# Patient Record
Sex: FEMALE | Race: WHITE | NOT HISPANIC OR LATINO | Employment: FULL TIME | URBAN - METROPOLITAN AREA
[De-identification: names, ages, dates, MRNs, and addresses within clinical notes are randomized per-mention and may not be internally consistent; named-entity substitution may affect disease eponyms.]

---

## 2019-05-06 ENCOUNTER — CLINICAL SUPPORT (OUTPATIENT)
Dept: OBGYN CLINIC | Facility: CLINIC | Age: 24
End: 2019-05-06
Payer: COMMERCIAL

## 2019-05-06 VITALS — HEIGHT: 64 IN | WEIGHT: 161.8 LBS | BODY MASS INDEX: 27.62 KG/M2

## 2019-05-06 DIAGNOSIS — N94.89 SUPPRESSION OF MENSES: Primary | ICD-10-CM

## 2019-05-06 LAB — SL AMB POCT URINE HCG: NEGATIVE

## 2019-05-06 PROCEDURE — 81025 URINE PREGNANCY TEST: CPT | Performed by: OBSTETRICS & GYNECOLOGY

## 2019-05-08 ENCOUNTER — OFFICE VISIT (OUTPATIENT)
Dept: OBGYN CLINIC | Facility: CLINIC | Age: 24
End: 2019-05-08
Payer: COMMERCIAL

## 2019-05-08 VITALS — WEIGHT: 160 LBS | BODY MASS INDEX: 27.46 KG/M2 | HEART RATE: 88 BPM

## 2019-05-08 DIAGNOSIS — N92.6 MENSTRUAL PERIOD LATE: Primary | ICD-10-CM

## 2019-05-08 PROCEDURE — 99203 OFFICE O/P NEW LOW 30 MIN: CPT | Performed by: NURSE PRACTITIONER

## 2019-05-09 DIAGNOSIS — N94.89 SUPPRESSION OF MENSES: Primary | ICD-10-CM

## 2019-05-14 ENCOUNTER — ULTRASOUND (OUTPATIENT)
Dept: OBGYN CLINIC | Facility: CLINIC | Age: 24
End: 2019-05-14
Payer: COMMERCIAL

## 2019-05-14 DIAGNOSIS — O36.80X1 ENCOUNTER TO DETERMINE FETAL VIABILITY OF PREGNANCY, FETUS 1: Primary | ICD-10-CM

## 2019-05-14 PROCEDURE — 76801 OB US < 14 WKS SINGLE FETUS: CPT | Performed by: OBSTETRICS & GYNECOLOGY

## 2019-05-16 ENCOUNTER — TELEPHONE (OUTPATIENT)
Dept: OBGYN CLINIC | Facility: CLINIC | Age: 24
End: 2019-05-16

## 2019-06-04 ENCOUNTER — INITIAL PRENATAL (OUTPATIENT)
Dept: OBGYN CLINIC | Facility: CLINIC | Age: 24
End: 2019-06-04
Payer: COMMERCIAL

## 2019-06-04 ENCOUNTER — ULTRASOUND (OUTPATIENT)
Dept: OBGYN CLINIC | Facility: CLINIC | Age: 24
End: 2019-06-04
Payer: COMMERCIAL

## 2019-06-04 VITALS
HEIGHT: 64 IN | WEIGHT: 167.8 LBS | BODY MASS INDEX: 28.65 KG/M2 | DIASTOLIC BLOOD PRESSURE: 68 MMHG | SYSTOLIC BLOOD PRESSURE: 122 MMHG

## 2019-06-04 DIAGNOSIS — Z34.01 ENCOUNTER FOR SUPERVISION OF NORMAL FIRST PREGNANCY IN FIRST TRIMESTER: Primary | ICD-10-CM

## 2019-06-04 DIAGNOSIS — O36.80X0 ENCOUNTER TO DETERMINE FETAL VIABILITY OF PREGNANCY, SINGLE OR UNSPECIFIED FETUS: Primary | ICD-10-CM

## 2019-06-04 DIAGNOSIS — Z36.9 ANTENATAL SCREENING ENCOUNTER: ICD-10-CM

## 2019-06-04 LAB
EXTERNAL ABO GROUPING: NORMAL
EXTERNAL ANTIBODY SCREEN: NORMAL
EXTERNAL GENE TEST BETA-THALASSEMIA: NORMAL
EXTERNAL GENE TEST BETA-THALASSEMIA: NORMAL
EXTERNAL HEMATOCRIT: 39.6 %
EXTERNAL HEMOGLOBIN: 13.8 G/DL
EXTERNAL HEPATITIS B SURFACE ANTIGEN: NEGATIVE
EXTERNAL HIV-1 ANTIBODY: NEGATIVE
EXTERNAL HIV-1/2 AB-AG: NORMAL
EXTERNAL PLATELET COUNT: 230 K/ΜL
EXTERNAL RH FACTOR: POSITIVE
EXTERNAL RUBELLA IGG QUANTITATION: NORMAL
EXTERNAL SYPHILIS RPR SCREEN: NORMAL

## 2019-06-04 PROCEDURE — OBC: Performed by: NURSE PRACTITIONER

## 2019-06-04 PROCEDURE — 76801 OB US < 14 WKS SINGLE FETUS: CPT | Performed by: OBSTETRICS & GYNECOLOGY

## 2019-06-04 RX ORDER — LACTOBACILLUS COMBINATION NO.9 4B CELL
CAPSULE ORAL
COMMUNITY
End: 2020-06-24

## 2019-06-05 ENCOUNTER — DOCUMENTATION (OUTPATIENT)
Dept: OBGYN CLINIC | Facility: CLINIC | Age: 24
End: 2019-06-05

## 2019-06-07 ENCOUNTER — OB ABSTRACT (OUTPATIENT)
Dept: OBGYN CLINIC | Facility: CLINIC | Age: 24
End: 2019-06-07

## 2019-07-08 ENCOUNTER — ROUTINE PRENATAL (OUTPATIENT)
Dept: PERINATAL CARE | Facility: OTHER | Age: 24
End: 2019-07-08
Payer: COMMERCIAL

## 2019-07-08 VITALS
HEART RATE: 81 BPM | BODY MASS INDEX: 27.9 KG/M2 | WEIGHT: 163.4 LBS | HEIGHT: 64 IN | SYSTOLIC BLOOD PRESSURE: 126 MMHG | DIASTOLIC BLOOD PRESSURE: 79 MMHG

## 2019-07-08 DIAGNOSIS — Z3A.13 13 WEEKS GESTATION OF PREGNANCY: ICD-10-CM

## 2019-07-08 DIAGNOSIS — Z36.82 ENCOUNTER FOR ANTENATAL SCREENING FOR NUCHAL TRANSLUCENCY: Primary | ICD-10-CM

## 2019-07-08 PROCEDURE — 76813 OB US NUCHAL MEAS 1 GEST: CPT | Performed by: OBSTETRICS & GYNECOLOGY

## 2019-07-08 PROCEDURE — 99241 PR OFFICE CONSULTATION NEW/ESTAB PATIENT 15 MIN: CPT | Performed by: OBSTETRICS & GYNECOLOGY

## 2019-07-08 NOTE — PATIENT INSTRUCTIONS
Thank you for choosing [address] for your  care today  If you have any questions about your ultrasound or care, please do not hesitate to contact us or your primary obstetrician

## 2019-07-08 NOTE — PROGRESS NOTES
[address]: Ms Marc Aquino was seen today at 13w5d for nuchal translucency ultrasound  See ultrasound report under "OB Procedures" tab  Please don't hesitate to contact our office with any concerns or questions    Xiang Daniel MD

## 2019-07-08 NOTE — LETTER
July 9, 2019     Nita Velasco, [address] [place] [address]    Patient: Renato Funes   YOB: 1995   Date of Visit: 7/8/2019       Dear Dr Stephanie Russo: Thank you for referring Renato Funes to me for evaluation  Below are my notes for this consultation  If you have questions, please do not hesitate to call me  I look forward to following your patient along with you  Sincerely,        Gennett Denver, MD        CC: No Recipients  Gennett Denver, MD  7/8/2019  1:54 PM  Signed  [address]: Ms Sherif Mullen was seen today at 13w5d for nuchal translucency ultrasound  See ultrasound report under "OB Procedures" tab  Please don't hesitate to contact our office with any concerns or questions    Gennett Denver, MD

## 2019-07-09 ENCOUNTER — ROUTINE PRENATAL (OUTPATIENT)
Dept: OBGYN CLINIC | Facility: CLINIC | Age: 24
End: 2019-07-09

## 2019-07-09 VITALS
WEIGHT: 163.6 LBS | BODY MASS INDEX: 28.08 KG/M2 | DIASTOLIC BLOOD PRESSURE: 76 MMHG | HEART RATE: 78 BPM | OXYGEN SATURATION: 99 % | SYSTOLIC BLOOD PRESSURE: 120 MMHG

## 2019-07-09 DIAGNOSIS — Z34.01 ENCOUNTER FOR SUPERVISION OF NORMAL FIRST PREGNANCY IN FIRST TRIMESTER: Primary | ICD-10-CM

## 2019-07-09 LAB
C TRACH RRNA SPEC QL PROBE: NOT DETECTED
N GONORRHOEA RRNA SPEC QL PROBE: NOT DETECTED

## 2019-07-09 PROCEDURE — PNV: Performed by: OBSTETRICS & GYNECOLOGY

## 2019-07-09 NOTE — PROGRESS NOTES
Pt is a 22 y o  Janyth Cowden 13w6d  Pregnancy is uncomplicated  Pt denies quickening  Denies vb, lof, ctx  PTL precautions reviewed  Pt report a rash on her chest and abdomen and inner thighs--pt has started using a new body wash--advised to stop as they appear allergic in nature  Ok to use Benadryl and benadryl cream--call if no improvement within 1 week  PNL labs wnl and reviewed with patient  Cultures obtained  Part 1 of Sequential screen completed  F/u 1 week

## 2019-07-12 ENCOUNTER — OB ABSTRACT (OUTPATIENT)
Dept: OBGYN CLINIC | Facility: CLINIC | Age: 24
End: 2019-07-12

## 2019-07-12 ENCOUNTER — PATIENT MESSAGE (OUTPATIENT)
Dept: OBGYN CLINIC | Facility: CLINIC | Age: 24
End: 2019-07-12

## 2019-07-12 ENCOUNTER — TELEPHONE (OUTPATIENT)
Dept: OBGYN CLINIC | Facility: CLINIC | Age: 24
End: 2019-07-12

## 2019-07-12 LAB
# FETUSES US: 1
AGE: NORMAL
B-HCG ADJ MOM SERPL: 2.1
B-HCG SERPL-ACNC: 151.8 IU/ML
COLLECT DATE: NORMAL
CURRENT SMOKER: NORMAL
DONATED EGG PATIENT QL: NO
FET CRL US.MEAS: 73 MM
FET CRL US.MEAS: NORMAL MM
FET NUCHAL FOLD MOM THICKNESS US.MEAS: 0.68
FET NUCHAL FOLD THICKNESS US.MEAS: 1.1 MM
FET NUCHAL FOLD THICKNESS US.MEAS: NORMAL MM
FET TS 21 RISK FROM MAT AGE: NORMAL
GA CLIN EST: 13.4 WK
GA METHOD: NORMAL
HX OF NTD NARR: NO
HX OF TRISOMY 21 NARR: NO
IDDM PATIENT QL: NO
INTEGRATED SCN PATIENT-IMP: NORMAL
PAPP-A MOM SERPL: 1.49
PAPP-A SERPL-MCNC: 1554.4 NG/ML
PHYSICIAN NPI: NORMAL
SL AMB NASAL BONE: PRESENT
SL AMB NTQR LOCATION ID#: NORMAL
SL AMB NTQR READING PHYS ID#: NORMAL
SL AMB REFERRING PHYSICIAN NAME: NORMAL
SL AMB REFERRING PHYSICIAN PHONE: NORMAL
SL AMB REPEAT SPECIMEN: NO
SL AMB TWIN B NASAL BONE: NORMAL
SONOGRAPHER NAME: NORMAL
SONOGRAPHER: NORMAL
SONOGRAPHER: NORMAL
TS 18 RISK FETUS: NORMAL
TS 21 RISK FETUS: NORMAL
US DATE: NORMAL
US FETUSES STUDY [IMP]: NORMAL

## 2019-07-12 NOTE — TELEPHONE ENCOUNTER
Dr Rusty Sen,     Patient called office today stating that rash that she had showed you at appointment has not gone away and seems to be getting worse  Patient reports she is using benadryl and it is not helping  She went out and bought an anti-fungal cream and no improvements  Patient is asking if there is another medication or ointment that can be prescribed  Patient was going wedding dress shopping and rash is to severe  Can you please advise on what to tell patient or please call patient   Thank you

## 2019-07-22 ENCOUNTER — TELEPHONE (OUTPATIENT)
Dept: PERINATAL CARE | Facility: CLINIC | Age: 24
End: 2019-07-22

## 2019-07-22 NOTE — LETTER
07/22/19  Codie Collado  1995    Thank you for completing Part 1 of your Sequential Screen  To obtain a complete test result, please complete blood work for Part 2 Sequential Screen between the weeks of 7/20 to 8/2  Based on your insurance coverage, please use one of the following locations  The other option is to go to www [email or web address]  Call our office for any questions at [phone]          Sincerely,    Curtis Denney RN

## 2019-07-22 NOTE — TELEPHONE ENCOUNTER
----- Message from Caridad Barnett MD sent at 7/15/2019 12:18 PM EDT -----  Twyla Aiken  RN staff, I've reviewed this Sequential Part 1 result which is normal, can you call her regarding this result? Thank you    Caridad Barnett MD

## 2019-07-22 NOTE — TELEPHONE ENCOUNTER
VMM left with normal part 1 seq screen results and instructons to complete pt 2 between the dates provided when she receives the [facility] mailed out today  Instructed to call if questions to M nurse line, and informed that part 2 results will be discussed at her future anatomy scan

## 2019-08-06 ENCOUNTER — ROUTINE PRENATAL (OUTPATIENT)
Dept: OBGYN CLINIC | Facility: CLINIC | Age: 24
End: 2019-08-06

## 2019-08-06 VITALS
DIASTOLIC BLOOD PRESSURE: 74 MMHG | BODY MASS INDEX: 28.84 KG/M2 | OXYGEN SATURATION: 98 % | HEART RATE: 83 BPM | WEIGHT: 168 LBS | SYSTOLIC BLOOD PRESSURE: 118 MMHG

## 2019-08-06 DIAGNOSIS — Z34.02 ENCOUNTER FOR SUPERVISION OF NORMAL FIRST PREGNANCY IN SECOND TRIMESTER: Primary | ICD-10-CM

## 2019-08-06 PROCEDURE — PNV: Performed by: OBSTETRICS & GYNECOLOGY

## 2019-08-06 NOTE — PROGRESS NOTES
Pt is a 22 y o   17w6d  Pregnancy is uncomplicated   Pt denies quickening  Denies vb, lof, ctx  PTL precautions reviewed  Pt has anatomy scan scheduled  F/U 4 weeks

## 2019-08-19 ENCOUNTER — ROUTINE PRENATAL (OUTPATIENT)
Dept: PERINATAL CARE | Facility: OTHER | Age: 24
End: 2019-08-19
Payer: COMMERCIAL

## 2019-08-19 VITALS
HEIGHT: 63 IN | DIASTOLIC BLOOD PRESSURE: 74 MMHG | HEART RATE: 73 BPM | WEIGHT: 196.8 LBS | BODY MASS INDEX: 34.87 KG/M2 | SYSTOLIC BLOOD PRESSURE: 135 MMHG

## 2019-08-19 DIAGNOSIS — Z3A.19 19 WEEKS GESTATION OF PREGNANCY: ICD-10-CM

## 2019-08-19 DIAGNOSIS — Z34.02 ENCOUNTER FOR SUPERVISION OF NORMAL FIRST PREGNANCY IN SECOND TRIMESTER: ICD-10-CM

## 2019-08-19 DIAGNOSIS — O35.8XX0 ECHOGENIC FOCUS OF HEART OF FETUS AFFECTING ANTEPARTUM CARE OF MOTHER, SINGLE OR UNSPECIFIED FETUS: Primary | ICD-10-CM

## 2019-08-19 DIAGNOSIS — Z36.86 ENCOUNTER FOR ANTENATAL SCREENING FOR CERVICAL LENGTH: ICD-10-CM

## 2019-08-19 DIAGNOSIS — O35.9XX0 SUSPECTED FETAL ANOMALY, ANTEPARTUM, SINGLE OR UNSPECIFIED FETUS: ICD-10-CM

## 2019-08-19 PROBLEM — O35.BXX0 ECHOGENIC FOCUS OF HEART OF FETUS AFFECTING ANTEPARTUM CARE OF MOTHER: Status: ACTIVE | Noted: 2019-08-19

## 2019-08-19 PROCEDURE — 76817 TRANSVAGINAL US OBSTETRIC: CPT | Performed by: OBSTETRICS & GYNECOLOGY

## 2019-08-19 PROCEDURE — 76811 OB US DETAILED SNGL FETUS: CPT | Performed by: OBSTETRICS & GYNECOLOGY

## 2019-08-19 PROCEDURE — 99213 OFFICE O/P EST LOW 20 MIN: CPT | Performed by: OBSTETRICS & GYNECOLOGY

## 2019-08-19 NOTE — PROGRESS NOTES
Please refer to the [facility] ultrasound report in Ob Procedures for additional information regarding the visit to the [facility]  today

## 2019-08-19 NOTE — LETTER
August 19, 2019     Dario Hernandez MD  [address] [facility]  [address]  [address] [facility]    Patient: Mason Jarrett   YOB: 1995   Date of Visit: 8/19/2019       Dear Dr Fernando Hyman: Thank you for referring Mason Jarrett to me for evaluation  Below are my notes for this consultation  If you have questions, please do not hesitate to call me  I look forward to following your patient along with you  Sincerely,        Mo Abraham MD        CC: No Recipients  Mo Abraham MD  8/19/2019  5:43 PM  Sign at close encounter  Please refer to the [facility] ultrasound report in Ob Procedures for additional information regarding the visit to the [facility], [facility]  today

## 2019-08-19 NOTE — PROGRESS NOTES
A transvaginal ultrasound was performed   Sonographer note on use of High Level Disinfection Process (Trophon) for transvaginal probe# 1used, serial #[id number]  Eamon Stovall RDMS

## 2019-08-21 ENCOUNTER — TELEPHONE (OUTPATIENT)
Dept: OBGYN CLINIC | Facility: CLINIC | Age: 24
End: 2019-08-21

## 2019-08-21 NOTE — TELEPHONE ENCOUNTER
Please reassure the patient that many women do not gain a lot of weight during the first 2 trimesters  The baby weighs less than 1 lb at 20 weeks  With a BMI >30 we only recommend 11-15 lbs the whole pregnancy, so actually she is doing a great job thus far

## 2019-08-21 NOTE — TELEPHONE ENCOUNTER
Patient left message on nurse line stating she is worried that she isn't gaining enough weight as she is 20 weeks through her pregnancy  She has fluctuated between gaining and loosing 3-4 bs since the start  Patient has upcoming appointment on September 3rd

## 2019-09-03 ENCOUNTER — ROUTINE PRENATAL (OUTPATIENT)
Dept: OBGYN CLINIC | Facility: CLINIC | Age: 24
End: 2019-09-03

## 2019-09-03 VITALS
BODY MASS INDEX: 29.8 KG/M2 | HEART RATE: 72 BPM | OXYGEN SATURATION: 98 % | DIASTOLIC BLOOD PRESSURE: 68 MMHG | SYSTOLIC BLOOD PRESSURE: 114 MMHG | WEIGHT: 168.2 LBS

## 2019-09-03 DIAGNOSIS — Z34.02 ENCOUNTER FOR SUPERVISION OF NORMAL FIRST PREGNANCY IN SECOND TRIMESTER: Primary | ICD-10-CM

## 2019-09-03 LAB
# FETUSES US: 1
AFP ADJ MOM SERPL: 1.01
AFP SERPL-MCNC: 58.8 NG/ML
B-HCG ADJ MOM SERPL: 3.33
B-HCG SERPL-ACNC: 58.9 IU/ML
COLLECT DATE: NORMAL
CURRENT SMOKER: NORMAL
FET CRL US.MEAS: 73 MM
FET NUCHAL FOLD MOM THICKNESS US.MEAS: 0.68
FET NUCHAL FOLD THICKNESS US.MEAS: 1.1 MM
FET TS 21 RISK FROM MAT AGE: NORMAL
GA CLIN EST: 20.7 WK
HX OF NTD NARR: NO
IDDM PATIENT QL: NO
INHIBIN A ADJ MOM SERPL: 1.42
INHIBIN A SERPL-MCNC: 273 PG/ML
INTEGRATED SCN PATIENT-IMP: NORMAL
NEURAL TUBE DEFECT RISK FETUS: NORMAL %
PAPP-A MOM SERPL: 1.49
PAPP-A SERPL-MCNC: 1554.4 NG/ML
PHYSICIAN NPI: NORMAL
SL AMB NASAL BONE: PRESENT
SL AMB REFERRING PHYSICIAN NAME: NORMAL
SL AMB REFERRING PHYSICIAN PHONE: NORMAL
SL AMB REPEAT SPECIMEN: NO
SL AMB SPECIMEN # FROM PART 1: NORMAL
SL AMB TWIN B NASAL BONE: NORMAL
TS 18 RISK FETUS: NORMAL
TS 21 RISK FETUS: NORMAL
U ESTRIOL ADJ MOM SERPL: 0.8
U ESTRIOL SERPL-MCNC: 1.54 NG/ML
US DATE: NORMAL

## 2019-09-03 PROCEDURE — PNV: Performed by: NURSE PRACTITIONER

## 2019-09-03 NOTE — PROGRESS NOTES
23 yo  at 21w6d gestation  Feeling well, no complaints  Denies LOF, VB or pain; has felt fetal movement  PN labs normal  SS part 1 normal; part 2 still pending  Lev 2 US: EIF, hypoplastic nasal bone; declines any genetic testing  Rpt US at 28w      S=D, normal FHTs, normal BP    RV 4w

## 2019-09-05 ENCOUNTER — TELEPHONE (OUTPATIENT)
Dept: PERINATAL CARE | Facility: CLINIC | Age: 24
End: 2019-09-05

## 2019-09-05 NOTE — TELEPHONE ENCOUNTER
----- Message from Jeannie Sheppard MD sent at 2019  2:42 PM EDT -----  Blake Abraham  RN staff, I've reviewed this Sequential Part 2 result which is normal, can you call her regarding this result? HCG is >3 0 MoM so I advise a third trimester growth ultrasound, and she was already advised to return at 28 weeks  Thank you    Jeannie Sheppard MD

## 2019-09-05 NOTE — TELEPHONE ENCOUNTER
Left VMM on # provided on communication consent  Results of Stepwise part 2 Sequential Screen provided  PT instructed to contact office with questions

## 2019-10-01 ENCOUNTER — ROUTINE PRENATAL (OUTPATIENT)
Dept: OBGYN CLINIC | Facility: CLINIC | Age: 24
End: 2019-10-01
Payer: COMMERCIAL

## 2019-10-01 VITALS
WEIGHT: 176.4 LBS | HEART RATE: 88 BPM | DIASTOLIC BLOOD PRESSURE: 60 MMHG | SYSTOLIC BLOOD PRESSURE: 100 MMHG | BODY MASS INDEX: 31.25 KG/M2 | OXYGEN SATURATION: 98 %

## 2019-10-01 DIAGNOSIS — Z23 NEED FOR INFLUENZA VACCINATION: ICD-10-CM

## 2019-10-01 DIAGNOSIS — O35.9XX0 SUSPECTED FETAL ANOMALY, ANTEPARTUM, SINGLE OR UNSPECIFIED FETUS: ICD-10-CM

## 2019-10-01 DIAGNOSIS — O35.8XX0 ECHOGENIC FOCUS OF HEART OF FETUS AFFECTING ANTEPARTUM CARE OF MOTHER, SINGLE OR UNSPECIFIED FETUS: Primary | ICD-10-CM

## 2019-10-01 PROCEDURE — 90471 IMMUNIZATION ADMIN: CPT

## 2019-10-01 PROCEDURE — 90686 IIV4 VACC NO PRSV 0.5 ML IM: CPT

## 2019-10-01 PROCEDURE — PNV: Performed by: OBSTETRICS & GYNECOLOGY

## 2019-10-01 NOTE — PROGRESS NOTES
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

## 2019-10-14 ENCOUNTER — ULTRASOUND (OUTPATIENT)
Dept: PERINATAL CARE | Facility: OTHER | Age: 24
End: 2019-10-14
Payer: COMMERCIAL

## 2019-10-14 VITALS
WEIGHT: 178 LBS | SYSTOLIC BLOOD PRESSURE: 108 MMHG | HEIGHT: 63 IN | DIASTOLIC BLOOD PRESSURE: 72 MMHG | BODY MASS INDEX: 31.54 KG/M2 | HEART RATE: 76 BPM

## 2019-10-14 DIAGNOSIS — O35.8XX0 ECHOGENIC FOCUS OF HEART OF FETUS AFFECTING ANTEPARTUM CARE OF MOTHER, SINGLE OR UNSPECIFIED FETUS: ICD-10-CM

## 2019-10-14 DIAGNOSIS — Z3A.27 27 WEEKS GESTATION OF PREGNANCY: ICD-10-CM

## 2019-10-14 DIAGNOSIS — O35.9XX0 SUSPECTED FETAL ANOMALY, ANTEPARTUM, SINGLE OR UNSPECIFIED FETUS: Primary | ICD-10-CM

## 2019-10-14 PROCEDURE — 76816 OB US FOLLOW-UP PER FETUS: CPT | Performed by: OBSTETRICS & GYNECOLOGY

## 2019-10-14 NOTE — PROGRESS NOTES
Please refer to the [facility] ultrasound report in Ob Procedures for additional information regarding the visit to the [facility]  today    Tunde Soto MD

## 2019-10-14 NOTE — LETTER
October 14, 2019     Siri Anderson, [address]     Patient: Jennie Nair   YOB: 1995   Date of Visit: 10/14/2019       Dear Dr Simona Caceres: Thank you for referring Jennie Nair to me for evaluation  Below are my notes for this consultation  If you have questions, please do not hesitate to call me  I look forward to following your patient along with you  Sincerely,        Flip Graf MD        CC: No Recipients  Flip Graf MD  10/14/2019  3:54 PM  Sign at close encounter    Please refer to the [facility] ultrasound report in Ob Procedures for additional information regarding the visit to the [facility]  today    Flip Graf MD

## 2019-10-17 ENCOUNTER — ROUTINE PRENATAL (OUTPATIENT)
Dept: OBGYN CLINIC | Facility: CLINIC | Age: 24
End: 2019-10-17

## 2019-10-17 VITALS
BODY MASS INDEX: 31.71 KG/M2 | WEIGHT: 179 LBS | DIASTOLIC BLOOD PRESSURE: 66 MMHG | SYSTOLIC BLOOD PRESSURE: 118 MMHG | HEIGHT: 63 IN

## 2019-10-17 DIAGNOSIS — O35.9XX0 SUSPECTED FETAL ANOMALY, ANTEPARTUM, SINGLE OR UNSPECIFIED FETUS: ICD-10-CM

## 2019-10-17 DIAGNOSIS — O35.8XX0 ECHOGENIC FOCUS OF HEART OF FETUS AFFECTING ANTEPARTUM CARE OF MOTHER, SINGLE OR UNSPECIFIED FETUS: Primary | ICD-10-CM

## 2019-10-17 LAB
EXTERNAL HEMATOCRIT: 35.7 %
EXTERNAL HEMOGLOBIN: 12 G/DL
EXTERNAL PLATELET COUNT: 207 K/ΜL
EXTERNAL SYPHILIS RPR SCREEN: NORMAL

## 2019-10-17 PROCEDURE — PNV: Performed by: OBSTETRICS & GYNECOLOGY

## 2019-10-17 NOTE — PROGRESS NOTES
Pt is a 22 y o  Mindi Dejanene 28w1d  Pregnancy is complicated by Obesity and EIF on u/s  Pt reports +FM  Denies vb, lof, ctx  PTL precautions  and fkc reviewed  3d trimester labs today  Recent u/s shows normal growth, nasal bone now visualized and EIF persists     F/u 2 weeks

## 2019-10-18 ENCOUNTER — OB ABSTRACT (OUTPATIENT)
Dept: OBGYN CLINIC | Facility: CLINIC | Age: 24
End: 2019-10-18

## 2019-10-18 LAB
ERYTHROCYTE [DISTWIDTH] IN BLOOD BY AUTOMATED COUNT: 12.5 % (ref 11–15)
GLUCOSE 1H P 50 G GLC PO SERPL-MCNC: 121 MG/DL
HCT VFR BLD AUTO: 35.7 % (ref 35–45)
HGB BLD-MCNC: 12 G/DL (ref 11.7–15.5)
MCH RBC QN AUTO: 29.1 PG (ref 27–33)
MCHC RBC AUTO-ENTMCNC: 33.6 G/DL (ref 32–36)
MCV RBC AUTO: 86.4 FL (ref 80–100)
PLATELET # BLD AUTO: 207 THOUSAND/UL (ref 140–400)
PMV BLD REES-ECKER: 11.2 FL (ref 7.5–12.5)
RBC # BLD AUTO: 4.13 MILLION/UL (ref 3.8–5.1)
RPR SER QL: NORMAL
WBC # BLD AUTO: 9.3 THOUSAND/UL (ref 3.8–10.8)

## 2019-10-30 ENCOUNTER — ROUTINE PRENATAL (OUTPATIENT)
Dept: OBGYN CLINIC | Facility: CLINIC | Age: 24
End: 2019-10-30

## 2019-10-30 VITALS — SYSTOLIC BLOOD PRESSURE: 114 MMHG | BODY MASS INDEX: 31.53 KG/M2 | WEIGHT: 178 LBS | DIASTOLIC BLOOD PRESSURE: 76 MMHG

## 2019-10-30 DIAGNOSIS — Z34.03 ENCOUNTER FOR SUPERVISION OF NORMAL FIRST PREGNANCY IN THIRD TRIMESTER: Primary | ICD-10-CM

## 2019-10-30 PROCEDURE — PNV: Performed by: NURSE PRACTITIONER

## 2019-11-12 ENCOUNTER — ROUTINE PRENATAL (OUTPATIENT)
Dept: OBGYN CLINIC | Facility: CLINIC | Age: 24
End: 2019-11-12
Payer: COMMERCIAL

## 2019-11-12 VITALS
DIASTOLIC BLOOD PRESSURE: 64 MMHG | BODY MASS INDEX: 31.96 KG/M2 | HEART RATE: 76 BPM | SYSTOLIC BLOOD PRESSURE: 108 MMHG | OXYGEN SATURATION: 99 % | WEIGHT: 180.4 LBS

## 2019-11-12 DIAGNOSIS — O35.8XX0 ECHOGENIC FOCUS OF HEART OF FETUS AFFECTING ANTEPARTUM CARE OF MOTHER, SINGLE OR UNSPECIFIED FETUS: Primary | ICD-10-CM

## 2019-11-12 DIAGNOSIS — Z23 NEED FOR TDAP VACCINATION: ICD-10-CM

## 2019-11-12 PROCEDURE — PNV: Performed by: OBSTETRICS & GYNECOLOGY

## 2019-11-12 PROCEDURE — 90471 IMMUNIZATION ADMIN: CPT

## 2019-11-12 PROCEDURE — 90715 TDAP VACCINE 7 YRS/> IM: CPT

## 2019-11-12 NOTE — PROGRESS NOTES
Pt is a 22 y o   31w6d  Pregnancy is complicated by Obesity and EIF on u/s  Pt reports +FM  Denies vb, lof, ctx  PTL precautions  and fkc reviewed  TDAP today  32 week folder given  F/U 2 weeks

## 2019-11-18 ENCOUNTER — TELEPHONE (OUTPATIENT)
Dept: OTHER | Facility: OTHER | Age: 24
End: 2019-11-18

## 2019-11-19 NOTE — TELEPHONE ENCOUNTER
Lori Mckinney 1995  CONFIDENTIALTY NOTICE: This fax transmission is intended only for the addressee  It contains information that is legally privileged,  confidential or otherwise protected from use or disclosure  If you are not the intended recipient, you are strictly prohibited from reviewing,  disclosing, copying using or disseminating any of this information or taking any action in reliance on or regarding this information  If you have  received this fax in error, please notify us immediately by telephone so that we can arrange for its return to us  Page: 1 of 3  Call Id: [id number]  Health Call  Standard Call Report  Health Call  Patient Name: Lori Mckinney  Gender: Female  : 1995  Age: 22 Y 9 M 8 D  Return Phone  Number: [phone] (Home)  Address: [address] [place] [address]/[facility]/Zip: [address] [place] [address]  Practice Name: [address] [place] [address] Charged:  Physician:  [address] [place] [address] Name:  Relationship To  Patient: Self  Return Phone Number: [phone] (Home)  Presenting Problem: " I have the worst nausea right now, "  Service Type: Triage  Charged Service 1: Rosa Maria Bull U  38  Name and  Number:  Nurse Assessment  Nurse: Kirill Linder Date/Time: 2019 8:03:57 PM  Type of assessment required:  ---General (Adult or Child)  Duration of Current S/S  ---Since  today  Location/Radiation  ---Nausea  Temperature (F) and route:  ---Denies  Symptom Specific Meds (Dose/Time):  ---None  Other S/S  ---Patient stated that she developed "worse nausea through the whole pregnancy"  today  Patient tried to though up, but wasn't able to  Patient stated that she had to lay  down from feeling sick  Patient denies any headache or fatigue  No diarrhea, no cold  symptoms  Pain Scale on scale of 1-10, 10 being the worst:  ---Denies  Symptom progression:  ---same  Intake and Output  ---WNL  Lori Mckinney 1995  CONFIDENTIALTY NOTICE: This fax transmission is intended only for the addressee   It contains information that is legally privileged,  confidential or otherwise protected from use or disclosure  If you are not the intended recipient, you are strictly prohibited from reviewing,  disclosing, copying using or disseminating any of this information or taking any action in reliance on or regarding this information  If you have  received this fax in error, please notify us immediately by telephone so that we can arrange for its return to us  Page: 2 of 3  Call Id: [id number]  Nurse Assessment  LMP/ Pregnancy:  ---32 weeks  Breastfeeding  ---No  Last Exam/Treatment:  ---11/12/2019  Protocols  Protocol Title Nurse Date/Time  Pregnancy - Morning Sickness (Nausea and  Vomiting of Pregnancy)  Tacho Smoke 11/18/2019 8:03:38 PM  Question Caller Affirmed  Disp  Time Disposition Final User  11/18/2019 8:17:28 PM See PCP When Office is Open (within 3  days)  Yes Ben, [address] [place] [address] Drive Advice Given Per Protocol  SEE PCP WITHIN 3 DAYS: * You need to be seen within 2 or 3 days  Call your doctor during regular office hours and make an  appointment  An urgent care center is often the best source of care if your doctor's office is closed or you can't get an appointment  NOTE: If office will be open tomorrow, tell caller to call then, not in 3 days  * IF PATIENT HAS NO PCP: An urgent care center is often  the best source of care if you do not have a regular doctor you can see in the next couple days  NOTE: Try to help caller find a doctor  Is there a physician referral line or other resource? Having a PCP or 'medical home' means better long-term care  REASSURANCE:  * It sounds like you are not dehydrated at this point  There is no risk to the baby from morning sickness as long as you prevent severe  dehydration from occurring  * However, your symptoms are not improving despite following the care advice  You need to be examined  by a physician within the next 3 days   STEP 1 - CLEAR FLUIDS: * Sip small amounts of water, bouillon, or sports-rehydration liquid  (Gatorade or Powerade)  * Other options: 1/2 strength flat lemon-lime soda or ginger ale; Pedialyte popsicles * Goal: The goal is 1000  -1500 mL /day  This can be achieved by drinking 4 oz (120 mL) per hour for 12 hours  * Starchy foods are most easily handled by the  stomach STEP 2 - DIET - CRACKERS: * Eat small frequent meals ('grazing throughout the day') * Try eating dry crackers or bread  before getting out of bed in the morning * Try crackers, ginger snap cookies, white bread, white rice, noodles, mashed potatoes, cereal,  apple sauce, etc  * Other options: clear soup with rice or noodles  STEP 3 - DIET - STARCHES, CHICKEN, FISH: * Advance diet as  tolerated  * Eat small frequent meals  Divide your food into 6 smaller meals per day  * Try soups, pasta dishes, mashed or baked potatoes,  rice  * Try baked chicken or baked fish  * Eat foods that taste good to you * Avoid fatty and spicy foods, and foods with strong aromas  PRENATAL MULTI-VITAMIN: * Take one once-a-day multivitamin (with Folate) every day  * Take the pill with food  (Reason:  minimize stomach irritation) * Some women find taking the pill at bedtime causes less nausea  CALL BACK IF: * Fever, abdominal pain,  or vaginal bleeding occur * Unable to keep any liquids down * Severe diarrhea occurs (many watery BMs) * No urination over12 hours *  You become worse  CARE ADVICE per Pregnancy - Morning Sickness (Adult) guideline  Caller Understands: Yes  Caller Disagree/Comply: Comply  PreDisposition: Gil Wise 1995  CONFIDENTIALTY NOTICE: This fax transmission is intended only for the addressee  It contains information that is legally privileged,  confidential or otherwise protected from use or disclosure   If you are not the intended recipient, you are strictly prohibited from reviewing,  disclosing, copying using or disseminating any of this information or taking any action in reliance on or regarding this information  If you have  received this fax in error, please notify us immediately by telephone so that we can arrange for its return to us  Page: 3 of 3  Call Id: [id number]  Comments  User: Philomena Gaytan Date/Time: 11/18/2019 8:19:06 PM  Patient agreed with Care Advice per ST Guideline  Patient stated that she would call PCP office tomorrow to follow up

## 2019-11-26 ENCOUNTER — ROUTINE PRENATAL (OUTPATIENT)
Dept: OBGYN CLINIC | Facility: CLINIC | Age: 24
End: 2019-11-26

## 2019-11-26 VITALS
OXYGEN SATURATION: 99 % | DIASTOLIC BLOOD PRESSURE: 70 MMHG | WEIGHT: 183.4 LBS | SYSTOLIC BLOOD PRESSURE: 110 MMHG | HEART RATE: 79 BPM | BODY MASS INDEX: 32.49 KG/M2

## 2019-11-26 DIAGNOSIS — O99.213 OBESITY AFFECTING PREGNANCY, ANTEPARTUM, THIRD TRIMESTER: ICD-10-CM

## 2019-11-26 DIAGNOSIS — O35.8XX0 ECHOGENIC FOCUS OF HEART OF FETUS AFFECTING ANTEPARTUM CARE OF MOTHER, SINGLE OR UNSPECIFIED FETUS: Primary | ICD-10-CM

## 2019-11-26 PROCEDURE — PNV: Performed by: OBSTETRICS & GYNECOLOGY

## 2019-11-26 NOTE — PROGRESS NOTES
Pt is a 22 y o   33w6d  Pregnancy is complicated by Obesity and EIF on U/S  Pt reports +FM  Denies vb, lof, ctx  PTL precautions  and fkc reviewed  F/U 2 weeks     GBS and consents next visit

## 2019-12-09 ENCOUNTER — HOSPITAL ENCOUNTER (OUTPATIENT)
Facility: HOSPITAL | Age: 24
Discharge: HOME | End: 2019-12-09
Attending: OBSTETRICS & GYNECOLOGY | Admitting: OBSTETRICS & GYNECOLOGY
Payer: COMMERCIAL

## 2019-12-09 ENCOUNTER — TELEPHONE (OUTPATIENT)
Dept: OBGYN CLINIC | Facility: CLINIC | Age: 24
End: 2019-12-09

## 2019-12-09 VITALS
DIASTOLIC BLOOD PRESSURE: 80 MMHG | SYSTOLIC BLOOD PRESSURE: 122 MMHG | HEART RATE: 97 BPM | RESPIRATION RATE: 16 BRPM | TEMPERATURE: 98.7 F

## 2019-12-09 PROBLEM — Z3A.35 35 WEEKS GESTATION OF PREGNANCY: Status: ACTIVE | Noted: 2019-12-09

## 2019-12-09 PROCEDURE — 99202 OFFICE O/P NEW SF 15 MIN: CPT

## 2019-12-09 PROCEDURE — NC001 PR NO CHARGE: Performed by: OBSTETRICS & GYNECOLOGY

## 2019-12-09 NOTE — TELEPHONE ENCOUNTER
Patient called office back stating pain was getting worse, patient instructed to go to [facility] L&D, L&D notified

## 2019-12-09 NOTE — PROGRESS NOTES
Triage Note - OB  Malaika Pat 22 y o  female MRN: [id number]  Unit/Bed#: L&D 65-18 Encounter: [id number]    Chief Complaint: contractions    NELSY: Estimated Date of Delivery: 20    HPI: 22 y o  female  at 35w5d presents with contractions that started at 12pm  Pt states they have been occurring every 10 minutes and lasting approximately 20 seconds  She was unsure if they were BH contractions, so she called her OB office who sent her in for evaluation  Pt reports recent intercourse  Denies vaginal bleeding or loss of fluid  Reports fetal movement  Vitals: Blood pressure 122/80, pulse 97, temperature 98 7 °F (37 1 °C), temperature source Oral, resp  rate 16, last menstrual period 2019 ,There is no height or weight on file to calculate BMI  Physical Exam  GEN: well developed and well nourished, alert, oriented times 3 and appears comfortable  Abd: soft, non tender and gravid  SVE: FT/TH/HI, posterior and firm consistency    FHR: 125bpm, moderate variability, positive accels, no decels, reactive  Bishop: occasional, with some irritability    Labs:   No visits with results within 1 Day(s) from this visit  Latest known visit with results is:   OB Abstract on 10/18/2019   Component Date Value    Hematocrit 10/17/2019 35 7     External Hemoglobin 10/17/2019 12 0     External Platelets  207     RPR 10/17/2019 Non-Reactive        Lab, Imaging and other studies: I have personally reviewed pertinent reports  A/P: 22 y o  female  at 27w7d with contractions, not in labor    1)False Labor   Pt reassured that she is not in labor based on SVE  Discussed common causes for contractions in pregnancy, such as BH, intercourse, dehydration, etc  Counseled on false vs labor contractions  Pt advised she may have some spotting from today's exam, but otherwise any vaginal bleeding should be reported     NST reactive with occasional ctx  2) Discharge instructions given to patient and labor precautions reviewed        D/w Dr Brent Celaya MD  [facility], PGY-3  12/9/2019 6:02 PM

## 2019-12-09 NOTE — TELEPHONE ENCOUNTER
Patient called stating she is 35w5d and is not sure if she is experiencing michelle campbell or contractions  Patient states she has not timed them, she will monitor and call the office

## 2019-12-10 ENCOUNTER — ROUTINE PRENATAL (OUTPATIENT)
Dept: OBGYN CLINIC | Facility: CLINIC | Age: 24
End: 2019-12-10

## 2019-12-10 VITALS
OXYGEN SATURATION: 100 % | WEIGHT: 183 LBS | HEART RATE: 83 BPM | DIASTOLIC BLOOD PRESSURE: 68 MMHG | BODY MASS INDEX: 32.42 KG/M2 | SYSTOLIC BLOOD PRESSURE: 114 MMHG

## 2019-12-10 DIAGNOSIS — O99.213 OBESITY AFFECTING PREGNANCY, ANTEPARTUM, THIRD TRIMESTER: Primary | ICD-10-CM

## 2019-12-10 DIAGNOSIS — O35.8XX0 ECHOGENIC FOCUS OF HEART OF FETUS AFFECTING ANTEPARTUM CARE OF MOTHER, SINGLE OR UNSPECIFIED FETUS: ICD-10-CM

## 2019-12-10 PROCEDURE — PNV: Performed by: OBSTETRICS & GYNECOLOGY

## 2019-12-10 NOTE — PROGRESS NOTES
Pt is a 22 y o   35w6d  Pregnancy is complicated by Obesity and EIF on u/s  Pt reports +FM  Denies vb, lof  Had frequent contractions yesterday and was evaluated on L&D  Reports her cervix was closed  She has had no further ctx   PTL precautions and fkc reviewed  GBS collected  Consents reviewed and signed  F/u weekly

## 2019-12-16 LAB — EXTERNAL GROUP B STREP ANTIGEN: NEGATIVE

## 2019-12-17 ENCOUNTER — ROUTINE PRENATAL (OUTPATIENT)
Dept: OBGYN CLINIC | Facility: CLINIC | Age: 24
End: 2019-12-17

## 2019-12-17 VITALS
SYSTOLIC BLOOD PRESSURE: 120 MMHG | WEIGHT: 184.4 LBS | DIASTOLIC BLOOD PRESSURE: 76 MMHG | HEIGHT: 64 IN | BODY MASS INDEX: 31.48 KG/M2

## 2019-12-17 DIAGNOSIS — O99.213 OBESITY AFFECTING PREGNANCY, ANTEPARTUM, THIRD TRIMESTER: Primary | ICD-10-CM

## 2019-12-17 PROCEDURE — PNV: Performed by: NURSE PRACTITIONER

## 2019-12-17 NOTE — PROGRESS NOTES
23 yo  at 36A5S gestation, complicated by obesity and EIF on US  Doing well, just in general discomfort and ready to have the baby! Baby is active, no LOF, VB  Had some contractions a week ago for which she was seen in L&D  Random  S=D, normal FHTs, normal BP  GBS negative    Labor precautions reinforced    RV one week

## 2019-12-23 ENCOUNTER — TELEPHONE (OUTPATIENT)
Dept: OBGYN CLINIC | Facility: CLINIC | Age: 24
End: 2019-12-23

## 2019-12-23 NOTE — TELEPHONE ENCOUNTER
Patient left message on nurse line asking that Melly Garcia return her call in regards to adding information to her FMLA paperwork

## 2019-12-24 ENCOUNTER — ROUTINE PRENATAL (OUTPATIENT)
Dept: OBGYN CLINIC | Facility: CLINIC | Age: 24
End: 2019-12-24

## 2019-12-24 VITALS
DIASTOLIC BLOOD PRESSURE: 72 MMHG | BODY MASS INDEX: 31.82 KG/M2 | SYSTOLIC BLOOD PRESSURE: 116 MMHG | WEIGHT: 185.4 LBS | HEART RATE: 84 BPM | OXYGEN SATURATION: 98 %

## 2019-12-24 DIAGNOSIS — O99.213 OBESITY AFFECTING PREGNANCY, ANTEPARTUM, THIRD TRIMESTER: Primary | ICD-10-CM

## 2019-12-24 PROCEDURE — PNV: Performed by: NURSE PRACTITIONER

## 2019-12-24 NOTE — PROGRESS NOTES
23 yo  at 74L3P gestation, complicated by obesity and EIF on US  Doing well, feeling lots of discomfort in pelvis  Baby is active  Denies LOF, VB    Random UCs   S<D, baby has dropped, much more pressure  Normal FHTs, normal BP    RV 1 wk  IOL scheduled for 20

## 2019-12-30 ENCOUNTER — ROUTINE PRENATAL (OUTPATIENT)
Dept: OBGYN CLINIC | Facility: CLINIC | Age: 24
End: 2019-12-30

## 2019-12-30 VITALS
BODY MASS INDEX: 31.34 KG/M2 | SYSTOLIC BLOOD PRESSURE: 120 MMHG | HEIGHT: 64 IN | WEIGHT: 183.6 LBS | DIASTOLIC BLOOD PRESSURE: 72 MMHG

## 2019-12-30 DIAGNOSIS — O99.213 OBESITY AFFECTING PREGNANCY, ANTEPARTUM, THIRD TRIMESTER: Primary | ICD-10-CM

## 2019-12-30 PROCEDURE — PNV: Performed by: NURSE PRACTITIONER

## 2019-12-30 NOTE — PROGRESS NOTES
23 yo  at 38w 5d gestation, presenting for interval visit to rule out ruptured membranes  Has been feeling passage of fluid since last night  Random michelle campbell  Baby is active, no bleeding  SVE:  Neg pooling, normal pH, neg ferning  Cervix posterior, soft, closed  She has her regular PN visit tomorrow with Dr Robin Marcelino, which she would like to keep

## 2019-12-31 ENCOUNTER — ROUTINE PRENATAL (OUTPATIENT)
Dept: OBGYN CLINIC | Facility: CLINIC | Age: 24
End: 2019-12-31

## 2019-12-31 VITALS
DIASTOLIC BLOOD PRESSURE: 80 MMHG | BODY MASS INDEX: 31.65 KG/M2 | OXYGEN SATURATION: 99 % | HEART RATE: 92 BPM | SYSTOLIC BLOOD PRESSURE: 120 MMHG | WEIGHT: 184.4 LBS

## 2019-12-31 DIAGNOSIS — O99.213 OBESITY AFFECTING PREGNANCY, ANTEPARTUM, THIRD TRIMESTER: Primary | ICD-10-CM

## 2019-12-31 DIAGNOSIS — O35.8XX0 ECHOGENIC FOCUS OF HEART OF FETUS AFFECTING ANTEPARTUM CARE OF MOTHER, SINGLE OR UNSPECIFIED FETUS: ICD-10-CM

## 2019-12-31 PROBLEM — Z3A.35 35 WEEKS GESTATION OF PREGNANCY: Status: RESOLVED | Noted: 2019-12-09 | Resolved: 2019-12-31

## 2019-12-31 PROBLEM — R10.2 PELVIC PAIN COMPLICATING PREGNANCY: Status: ACTIVE | Noted: 2019-12-12

## 2019-12-31 PROBLEM — O26.899 PELVIC PAIN COMPLICATING PREGNANCY: Status: ACTIVE | Noted: 2019-12-12

## 2019-12-31 PROCEDURE — PNV: Performed by: OBSTETRICS & GYNECOLOGY

## 2019-12-31 NOTE — PROGRESS NOTES
Pt is a 22 y o  Dominic Yash 38w6d  Pregnancy is complicated by Obesity and EIF on ultrasound  Pt reports +FM  Denies vb, lof, ctx   Labor precautions and fkc reviewed  Desires elective IOL on 1/4/2020, scheduled for 8 pm

## 2020-01-04 ENCOUNTER — HOSPITAL ENCOUNTER (OUTPATIENT)
Dept: LABOR AND DELIVERY | Facility: HOSPITAL | Age: 25
Discharge: HOME | End: 2020-01-04
Payer: COMMERCIAL

## 2020-01-04 ENCOUNTER — HOSPITAL ENCOUNTER (INPATIENT)
Facility: HOSPITAL | Age: 25
LOS: 2 days | Discharge: HOME | End: 2020-01-06
Attending: OBSTETRICS & GYNECOLOGY | Admitting: OBSTETRICS & GYNECOLOGY
Payer: COMMERCIAL

## 2020-01-04 DIAGNOSIS — O99.213 OBESITY AFFECTING PREGNANCY, ANTEPARTUM, THIRD TRIMESTER: Primary | ICD-10-CM

## 2020-01-04 PROBLEM — Z3A.39 39 WEEKS GESTATION OF PREGNANCY: Status: ACTIVE | Noted: 2020-01-04

## 2020-01-04 LAB
ABO GROUP BLD: NORMAL
BLD GP AB SCN SERPL QL: NEGATIVE
ERYTHROCYTE [DISTWIDTH] IN BLOOD BY AUTOMATED COUNT: 12.5 % (ref 11.6–15.1)
HCT VFR BLD AUTO: 36.6 % (ref 34.8–46.1)
HGB BLD-MCNC: 12.4 G/DL (ref 11.5–15.4)
MCH RBC QN AUTO: 27.5 PG (ref 26.8–34.3)
MCHC RBC AUTO-ENTMCNC: 33.9 G/DL (ref 31.4–37.4)
MCV RBC AUTO: 81 FL (ref 82–98)
PLATELET # BLD AUTO: 261 THOUSANDS/UL (ref 149–390)
PMV BLD AUTO: 10.9 FL (ref 8.9–12.7)
RBC # BLD AUTO: 4.51 MILLION/UL (ref 3.81–5.12)
RH BLD: POSITIVE
SPECIMEN EXPIRATION DATE: NORMAL
WBC # BLD AUTO: 9.66 THOUSAND/UL (ref 4.31–10.16)

## 2020-01-04 PROCEDURE — 86850 RBC ANTIBODY SCREEN: CPT | Performed by: OBSTETRICS & GYNECOLOGY

## 2020-01-04 PROCEDURE — 86900 BLOOD TYPING SEROLOGIC ABO: CPT | Performed by: OBSTETRICS & GYNECOLOGY

## 2020-01-04 PROCEDURE — 4A1HXCZ MONITORING OF PRODUCTS OF CONCEPTION, CARDIAC RATE, EXTERNAL APPROACH: ICD-10-PCS | Performed by: OBSTETRICS & GYNECOLOGY

## 2020-01-04 PROCEDURE — 86901 BLOOD TYPING SEROLOGIC RH(D): CPT | Performed by: OBSTETRICS & GYNECOLOGY

## 2020-01-04 PROCEDURE — 3E0P7VZ INTRODUCTION OF HORMONE INTO FEMALE REPRODUCTIVE, VIA NATURAL OR ARTIFICIAL OPENING: ICD-10-PCS | Performed by: OBSTETRICS & GYNECOLOGY

## 2020-01-04 PROCEDURE — 99024 POSTOP FOLLOW-UP VISIT: CPT | Performed by: OBSTETRICS & GYNECOLOGY

## 2020-01-04 PROCEDURE — 85027 COMPLETE CBC AUTOMATED: CPT | Performed by: OBSTETRICS & GYNECOLOGY

## 2020-01-04 PROCEDURE — 86592 SYPHILIS TEST NON-TREP QUAL: CPT | Performed by: OBSTETRICS & GYNECOLOGY

## 2020-01-04 RX ORDER — ONDANSETRON 2 MG/ML
4 INJECTION INTRAMUSCULAR; INTRAVENOUS EVERY 6 HOURS PRN
Status: DISCONTINUED | OUTPATIENT
Start: 2020-01-04 | End: 2020-01-05

## 2020-01-04 RX ORDER — SODIUM CHLORIDE, SODIUM LACTATE, POTASSIUM CHLORIDE, CALCIUM CHLORIDE 600; 310; 30; 20 MG/100ML; MG/100ML; MG/100ML; MG/100ML
125 INJECTION, SOLUTION INTRAVENOUS CONTINUOUS
Status: DISCONTINUED | OUTPATIENT
Start: 2020-01-04 | End: 2020-01-05

## 2020-01-04 RX ORDER — OXYTOCIN/RINGER'S LACTATE 30/500 ML
1-30 PLASTIC BAG, INJECTION (ML) INTRAVENOUS
Status: DISCONTINUED | OUTPATIENT
Start: 2020-01-04 | End: 2020-01-05

## 2020-01-04 RX ADMIN — MISOPROSTOL 25 MCG: 100 TABLET ORAL at 20:27

## 2020-01-05 ENCOUNTER — ANESTHESIA EVENT (INPATIENT)
Dept: ANESTHESIOLOGY | Facility: HOSPITAL | Age: 25
End: 2020-01-05
Payer: COMMERCIAL

## 2020-01-05 ENCOUNTER — ANESTHESIA (INPATIENT)
Dept: ANESTHESIOLOGY | Facility: HOSPITAL | Age: 25
End: 2020-01-05
Payer: COMMERCIAL

## 2020-01-05 LAB
BASE EXCESS BLDCOA CALC-SCNC: -3.5 MMOL/L (ref 3–11)
BASE EXCESS BLDCOV CALC-SCNC: -3.8 MMOL/L (ref 1–9)
HCO3 BLDCOA-SCNC: 21.8 MMOL/L (ref 17.3–27.3)
HCO3 BLDCOV-SCNC: 21.8 MMOL/L (ref 12.2–28.6)
O2 CT VFR BLDCOA CALC: 17.6 ML/DL
OXYHGB MFR BLDCOA: 75.6 %
OXYHGB MFR BLDCOV: 61.4 %
PCO2 BLDCOA: 40.6 MM[HG] (ref 30–60)
PCO2 BLDCOV: 41.3 MM HG (ref 27–43)
PH BLDCOA: 7.35 [PH] (ref 7.23–7.43)
PH BLDCOV: 7.34 [PH] (ref 7.19–7.49)
PO2 BLDCOA: 32 MM HG (ref 5–25)
PO2 BLDCOV: 26.3 MM HG (ref 15–45)
SAO2 % BLDCOV: 14 ML/DL

## 2020-01-05 PROCEDURE — 82805 BLOOD GASES W/O2 SATURATION: CPT | Performed by: OBSTETRICS & GYNECOLOGY

## 2020-01-05 PROCEDURE — 59400 OBSTETRICAL CARE: CPT | Performed by: OBSTETRICS & GYNECOLOGY

## 2020-01-05 RX ORDER — OXYTOCIN/RINGER'S LACTATE 30/500 ML
250 PLASTIC BAG, INJECTION (ML) INTRAVENOUS CONTINUOUS
Status: ACTIVE | OUTPATIENT
Start: 2020-01-05 | End: 2020-01-05

## 2020-01-05 RX ORDER — ROPIVACAINE HYDROCHLORIDE 2 MG/ML
INJECTION, SOLUTION EPIDURAL; INFILTRATION; PERINEURAL CONTINUOUS PRN
Status: DISCONTINUED | OUTPATIENT
Start: 2020-01-05 | End: 2020-01-05 | Stop reason: SURG

## 2020-01-05 RX ORDER — IBUPROFEN 600 MG/1
600 TABLET ORAL EVERY 6 HOURS PRN
Status: DISCONTINUED | OUTPATIENT
Start: 2020-01-05 | End: 2020-01-06 | Stop reason: HOSPADM

## 2020-01-05 RX ORDER — ROPIVACAINE HYDROCHLORIDE 2 MG/ML
INJECTION, SOLUTION EPIDURAL; INFILTRATION; PERINEURAL AS NEEDED
Status: DISCONTINUED | OUTPATIENT
Start: 2020-01-05 | End: 2020-01-05 | Stop reason: SURG

## 2020-01-05 RX ORDER — LIDOCAINE HYDROCHLORIDE AND EPINEPHRINE 15; 5 MG/ML; UG/ML
INJECTION, SOLUTION EPIDURAL AS NEEDED
Status: DISCONTINUED | OUTPATIENT
Start: 2020-01-05 | End: 2020-01-05 | Stop reason: SURG

## 2020-01-05 RX ORDER — DOCUSATE SODIUM 100 MG/1
100 CAPSULE, LIQUID FILLED ORAL 2 TIMES DAILY
Status: DISCONTINUED | OUTPATIENT
Start: 2020-01-05 | End: 2020-01-06 | Stop reason: HOSPADM

## 2020-01-05 RX ORDER — CALCIUM CARBONATE 200(500)MG
1000 TABLET,CHEWABLE ORAL DAILY PRN
Status: DISCONTINUED | OUTPATIENT
Start: 2020-01-05 | End: 2020-01-06 | Stop reason: HOSPADM

## 2020-01-05 RX ORDER — ACETAMINOPHEN 325 MG/1
650 TABLET ORAL EVERY 6 HOURS PRN
Status: DISCONTINUED | OUTPATIENT
Start: 2020-01-05 | End: 2020-01-06 | Stop reason: HOSPADM

## 2020-01-05 RX ORDER — ONDANSETRON 2 MG/ML
4 INJECTION INTRAMUSCULAR; INTRAVENOUS EVERY 8 HOURS PRN
Status: DISCONTINUED | OUTPATIENT
Start: 2020-01-05 | End: 2020-01-06 | Stop reason: HOSPADM

## 2020-01-05 RX ORDER — DIAPER,BRIEF,INFANT-TODD,DISP
1 EACH MISCELLANEOUS AS NEEDED
Status: DISCONTINUED | OUTPATIENT
Start: 2020-01-05 | End: 2020-01-06 | Stop reason: HOSPADM

## 2020-01-05 RX ADMIN — ROPIVACAINE HYDROCHLORIDE 5 ML: 2 INJECTION, SOLUTION EPIDURAL; INFILTRATION at 06:30

## 2020-01-05 RX ADMIN — WITCH HAZEL 1 PAD: 500 SOLUTION RECTAL; TOPICAL at 11:03

## 2020-01-05 RX ADMIN — ROPIVACAINE HYDROCHLORIDE: 2 INJECTION, SOLUTION EPIDURAL; INFILTRATION at 06:37

## 2020-01-05 RX ADMIN — SODIUM CHLORIDE, SODIUM LACTATE, POTASSIUM CHLORIDE, AND CALCIUM CHLORIDE 999 ML/HR: .6; .31; .03; .02 INJECTION, SOLUTION INTRAVENOUS at 05:21

## 2020-01-05 RX ADMIN — ONDANSETRON 4 MG: 2 INJECTION INTRAMUSCULAR; INTRAVENOUS at 06:19

## 2020-01-05 RX ADMIN — BENZOCAINE AND LEVOMENTHOL: 200; 5 SPRAY TOPICAL at 11:03

## 2020-01-05 RX ADMIN — SODIUM CHLORIDE, SODIUM LACTATE, POTASSIUM CHLORIDE, AND CALCIUM CHLORIDE 999 ML/HR: .6; .31; .03; .02 INJECTION, SOLUTION INTRAVENOUS at 01:05

## 2020-01-05 RX ADMIN — DOCUSATE SODIUM 100 MG: 100 CAPSULE, LIQUID FILLED ORAL at 20:09

## 2020-01-05 RX ADMIN — Medication 250 MILLI-UNITS/MIN: at 10:09

## 2020-01-05 RX ADMIN — LIDOCAINE HYDROCHLORIDE AND EPINEPHRINE 3 ML: 15; 5 INJECTION, SOLUTION EPIDURAL at 06:24

## 2020-01-05 RX ADMIN — SODIUM CHLORIDE, SODIUM LACTATE, POTASSIUM CHLORIDE, AND CALCIUM CHLORIDE 999 ML/HR: .6; .31; .03; .02 INJECTION, SOLUTION INTRAVENOUS at 06:28

## 2020-01-05 RX ADMIN — ROPIVACAINE HYDROCHLORIDE 5 ML: 2 INJECTION, SOLUTION EPIDURAL; INFILTRATION at 06:27

## 2020-01-05 RX ADMIN — ROPIVACAINE HYDROCHLORIDE 10 ML/HR: 2 INJECTION, SOLUTION EPIDURAL; INFILTRATION at 06:34

## 2020-01-05 NOTE — OB LABOR/OXYTOCIN SAFETY PROGRESS
Labor Progress Note - Chucky Huddleston 22 y o  female MRN: [id number]    Unit/Bed#: L&D 325-01 Encounter: [id number]       Contraction Frequency (minutes): (diff picking up when pt on her side)  Contraction Quality: Moderate  Tachysystole: No   Dilation: 3        Effacement (%): 80  Station: -2  Baseline Rate: 125 bpm(diff trace at times, adj)     FHR Category: Category I             Notes/comments:     Pt SROM'd for clear fluid at approximately 0500, on exam making cervical change, pt very uncomfortable would like epidiural, bolusing fluids now  Continue expectant management, if unchanged, pitocin titration      Esvin Molina DO 1/5/2020 5:14 AM

## 2020-01-05 NOTE — ANESTHESIA PROCEDURE NOTES
Epidural Block    Patient location during procedure: OB  Start time: 1/5/2020 6:24 AM  Staffing  Anesthesiologist: Magi Laureano DO  Performed: anesthesiologist   Preanesthetic Checklist  Completed: patient identified, site marked, surgical consent, pre-op evaluation, timeout performed, IV checked, risks and benefits discussed and monitors and equipment checked  Epidural  Patient position: sitting  Prep: Betadine  Patient monitoring: heart rate, continuous pulse ox and frequent blood pressure checks  Approach: midline  Location: lumbar (1-5)  Injection technique: TALAT saline  Needle  Needle type: Tuohy   Needle gauge: 18 G  Catheter type: side hole  Catheter size: 20 G  Catheter at skin depth: 10 cm  Test dose: negativenegative aspiration for CSF, negative aspiration for heme and no paresthesia on injection  patient tolerated the procedure well with no immediate complications

## 2020-01-05 NOTE — OB LABOR/OXYTOCIN SAFETY PROGRESS
Labor Progress Note - Reilly Marcial 22 y o  female MRN: [id number]    Unit/Bed#: L&D 325-01 Encounter: [id number]       Contraction Frequency (minutes): 3-4  Contraction Quality: Moderate  Tachysystole: No   Dilation: 10  Dilation Complete Date: 01/05/20  Dilation Complete Time: 0854  Effacement (%): 100  Station: 2  Baseline Rate: 120 bpm     FHR Category: Category I             Notes/comments:   Pt without complaints  Advised she is 10 cm   Will empty bladder and begin maternal expulsive efforts    Edward Manning MD 1/5/2020 8:58 AM

## 2020-01-05 NOTE — L&D DELIVERY NOTE
Vaginal Delivery Summary - OB/GYN   Rose Rodriguez 22 y o  female MRN: [id number]  Unit/Bed#: L&D 325-01 Encounter: [id number]    Predelivery Diagnosis:  1  Pregnancy at 39w4d  2  GBS negative by culture    Postdelivery Diagnosis:  1  Same as above  2  Delivery of term     Procedure: Spontaneous Vaginal Delivery    Attending: Lawrence Coleman    Assistant: Edgar    Anesthesia: Epidural    QBL: 136cc  Admission H 4  Admission platelets: 325    Complications: none apparent    Specimens: cord blood, arterial and venous cord blood gasses, placenta to storage     Findings:   1  Viable female at 10:07, with APGARS of 8 and 9 at 1 and 5 minutes respectively,weight 7# 1 9 oz  2  Spontaneous delivery of intact placenta at 10:11  3  Blood gases:    Umbilical Cord Venous Blood Gas:  Results from last 7 days   Lab Units 20  1008   PH COV  7 340   PCO2 COV mm HG 41 3   HCO3 COV mmol/L 21 8   BASE EXC COV mmol/L -3 8*   O2 CT CD VB mL/dL 14 0   O2 HGB, VENOUS CORD % 41 0     Umbilical Cord Arterial Blood Gas:  Results from last 7 days   Lab Units 20  1008   PH COA  7 348   PCO2 COA  40 6   PO2 COA mm HG 32 0*   HCO3 COA mmol/L 21 8   BASE EXC COA mmol/L -3 5*   O2 CONTENT CORD ART ml/dl 17 6   O2 HGB, ARTERIAL CORD % 75 6       Disposition:  Patient tolerated the procedure well and was recovering in labor and delivery room     Brief history and labor course:  Ms Rose Rodriguez is a 22 y o  [address] [facility] 39[facility]  She presented to labor and delivery for elective induction of labor  She received one dose of Cytotec  About five hours later, she spontaneously ruptured her membranes with clear fluid  Thereafter, she made precipitous change on her own, becoming completely dilated about four hours after rupture  Description of procedure    After pushing for 35 minutes, at 10:07 patient delivered a viable female , wt 7 lb 1 9 oz, apgars of 8 (1 min) and 9 (5 min)  The fetal vertex delivered spontaneously   Baby was checked for nuchal  There was a tight nuchal that was clamped and cut on the perinuem  The anterior right shoulder delivered atraumatically with maternal expulsive forces and the assistance of downward traction  The posterior shoulder delivered with maternal expulsive forces and the assistance of upward traction  The remainder of the fetus delivered spontaneously  Upon delivery, the infant was placed on the mothers abdomen  The infant was noted to cry spontaneously and was moving all extremities appropriately  There was no evidence for injury  Awaiting nurse resuscitators evaluated the   Arterial and venous cord blood gases and cord blood was collected for analysis  These were promptly sent to the lab  In the immediate post-partum, 30 units of IV pitocin was administered, and the uterus was noted to contract down well with massage and pitocin  The placenta delivered spontaneously at 10:00 and was noted to have a centrally inserted 3 vessel cord  The vagina, cervix, perineum, and rectum were inspected and there were no lacerations requiring repair  At the conclusion of the procedure, all needle, sponge, and instrument counts were noted to be correct  Patient tolerated the procedure well and was allowed to recover in labor and delivery room with family and  before being transferred to the post-partum floor  Dr Judith Diaz was present and participated in all key portions of the case  Tarik Faulkner MD  2020  10:43 AM     I agree with the operative report as dictated by Dr Constantino Sharif and edited by me  I was present for and participated in the entire procedure      Alex Cuellar MD

## 2020-01-05 NOTE — H&P
History & Physical - OB/GYN   Kassandra Duron 22 y o  female MRN: [id number]  Unit/Bed#: L&D 325-01 Encounter: [id number]    22 y o  yo  at 39w3d weeks by LMP which was consistent with first trimester ultrasound        Chief complaint:  "I am here for my induction of labor"    HPI:  Contractions:  no  Fetal movement:  yes  Vaginal bleeding:   no  Leaking of fluid:  no      Pregnancy Complications:  Patient Active Problem List   Diagnosis    Suspected fetal anomaly, antepartum    Echogenic focus of heart of fetus affecting antepartum care of mother    Obesity affecting pregnancy, antepartum, third trimester    Pelvic pain complicating pregnancy    39 weeks gestation of pregnancy       PMH:  Past Medical History:   Diagnosis Date    Varicella        PSH:  Past Surgical History:   Procedure Laterality Date    KIDNEY SURGERY Bilateral     bilateral ureter reimplantation       Social Hx:  Denies T/E/D use    OB Hx:  OB History    Para Term  AB Living   1 0 0 0 0 0   SAB TAB Ectopic Multiple Live Births   0 0 0 0 0      # Outcome Date GA Lbr Rik/2nd Weight Sex Delivery Anes PTL Lv   1 Current                Meds:  No current facility-administered medications on file prior to encounter  Current Outpatient Medications on File Prior to Encounter   Medication Sig Dispense Refill    Prenatal Vit-Min-FA-Fish Oil ([facility]) 0 4-113 5 MG CHEW Chew         Allergies:   Allergies   Allergen Reactions    Pollen Extract        Labs:  Blood type: O+  Antibody: negative  Group B strep: negative  HIV: negative  Hepatitis B: negative  RPR: non reactive  Rubella: Immune  Varicella not tested  1 hour Glucose: 121    Physical Exam:  /70 (BP Location: Right arm)   Pulse 100   Temp 98 8 °F (37 1 °C) (Oral)   Resp 18   Ht 5' 4" (1 626 m)   Wt 80 7 kg (178 lb)   LMP 2019 (Exact Date)   BMI 30 55 kg/m²         HEENT: atraumatic, normocephalic  Heart: RRR, NMRG  Lungs: CTA b/l, no wrr  Abdomen: gravid, non-tender, non-distended  Extremities: non-tender, no edema    Estimated Fetal Weight: 7 5 lbs  Presentation: cephalic    SVE: 1 5 / 31% / -2; membranes stripped and cytotec placed  FHT:  140 / Moderate 6 - 25 bpm / + accels, no decels  [place]: none    Membranes: intact    Assessment:   22 y o   at 39w3d weeks for elective induction of labor  Pregnancy complicated by EIF on level 2 ultrasound    Plan:   1  Admit to L&D  2  CBC, RPR, type and screen  3   Cytotec for induction of labor  Epidural upon request    Nimo Toledo MD

## 2020-01-05 NOTE — OB LABOR/OXYTOCIN SAFETY PROGRESS
Labor Progress Note - Arnoldo Hercules 22 y o  female MRN: [id number]    Unit/Bed#: L&D 325-01 Encounter: [id number]       Contraction Frequency (minutes): 2-3  Contraction Quality: Mild  Tachysystole: No   Dilation: 2-3        Effacement (%): 90  Station: -2  Baseline Rate: 135 bpm     FHR Category: Category I             Notes/comments:     Pt examined, making excellent progress after cytotec and membrane stripping  Intermittent late decelerations noted while patient on back, resolved with maternal repostioning on side and cat 1 FHR tracing  Will continue to monitor expectantly, update Dr Mary Burgos accordingly  Pt comfortable currently      Sadie Brunner, DO 1/5/2020 1:41 AM

## 2020-01-05 NOTE — ANESTHESIA PREPROCEDURE EVALUATION
Review of Systems/Medical History  Patient summary reviewed  Chart reviewed  No history of anesthetic complications     Cardiovascular  Negative cardio ROS    Pulmonary  Negative pulmonary ROS        GI/Hepatic  Negative GI/hepatic ROS          Negative  ROS        Endo/Other  Negative endo/other ROS      GYN  Currently pregnant ,          Hematology  Negative hematology ROS      Musculoskeletal  Negative musculoskeletal ROS        Neurology  Negative neurology ROS      Psychology   Negative psychology ROS              Physical Exam    Airway    Mallampati score: II  TM Distance: >3 FB  Neck ROM: full     Dental   No notable dental hx     Cardiovascular  Comment: Negative ROS, Rhythm: regular, Rate: normal, Cardiovascular exam normal    Pulmonary  Breath sounds clear to auscultation,     Other Findings        Anesthesia Plan  ASA Score- 2     Anesthesia Type- epidural with ASA Monitors  Additional Monitors:   Airway Plan:         Plan Factors-    Induction-     Postoperative Plan-     Informed Consent- Anesthetic plan and risks discussed with patient

## 2020-01-05 NOTE — OB LABOR/OXYTOCIN SAFETY PROGRESS
Labor Progress Note - Naomi Schmitt 22 y o  female MRN: [id number]    Unit/Bed#: L&D 325-01 Encounter: [id number]       Contraction Frequency (minutes): 2-4  Contraction Quality: Mild, Moderate  Tachysystole: No   Dilation: 2-3        Effacement (%): 80  Station: -2  Baseline Rate: 120 bpm     FHR Category: Category I             Notes/comments:   Pt reports her contractions are increasing in intensity and are located in her back  Declines pain management at this time  Will continue expectant management at this time   Consider pitocin if no progress at the next examination    Patience Newton MD 1/5/2020 3:55 AM

## 2020-01-05 NOTE — DISCHARGE SUMMARY
Discharge Summary - Naomi Schmitt 22 y o  female MRN: [id number]    Unit/Bed#: L&D 304-01 Encounter: [id number]    Admission Date: 2020     Discharge Date: 20        Admitting Attending: Dr Mario Agudelo  Delivering Attending: Dr Mario Agudelo  Discharge Attending: Dr Mario Agudelo    Diagnosis:   IUP at 39w4d     Procedures: Spontaneous Vaginal Delivery    Complications: none apparent    Hospital Course:Ms Naomi Schmitt is a 22 y o  [facility] at 39wk4d  She presented to labor and delivery for elective induction of labor  She received one dose of Cytotec  About five hours later, she spontaneously ruptured her membranes with clear fluid  Thereafter, she made precipitous change on her own, becoming completely dilated about four hours after rupture and was ready to push  After pushing for 35 minutes a viable female  was born at 10:07, with APGARS of 8 and 9 at 1 and 5 minutes respectively  Spontaneous delivery of intact placenta at 10:11  Patient was transferred to post-partum and was stable overnight with no concerns or complaints  Condition at discharge: good     On day of discharge, patient was tolerating PO, passing flatus, urinating, and ambulating  Her pain was well controlled with oral analgesics  She was discharged home on postpartum day #1 with standard post partum instructions to follow up with her physician in 3-6 weeks for a postpartum appointment  Discharge instructions/Information to patient and family:   - Do not place anything (no partner, tampons or douche) in your vagina for 6 weeks    -You may walk for exercise for the first 6 weeks then gradually return to your usual activities    -Please do not drive for 1 week if you have no stitches and for 2 weeks if you have stitches or underwent a  delivery     -You may take baths or shower per your preference    -Please look at your bust (breasts) in the mirror daily and call for redness or tenderness or increased warmth    -Please call us for temperature > 100 4*F or 38* C, worsening pain or a foul discharge  Discharge Medications:   Prenatal vitamin daily for 6 months or the duration of nursing whichever is longer  Motrin 600 mg orally every 6 hours as needed for pain  Tylenol (over the counter) per bottle directions as needed for pain: do NOT use with percocet  Hydrocortisone cream 1% (over the counter) applied 1-2x daily to hemorrhoids as needed    Provisions for Follow-Up Care: Follow up with your doctor in 3 weeks for postpartum care       Planned Readmission: No    Nadia Donnelly MD  01/06/20  1:28 PM

## 2020-01-05 NOTE — OB LABOR/OXYTOCIN SAFETY PROGRESS
Labor Progress Note - Lori Amel 22 y o  female MRN: [id number]    Unit/Bed#: L&D 325-01 Encounter: [id number]       Contraction Frequency (minutes): 2-3  Contraction Quality: Mild  Tachysystole: No   Dilation: 2        Effacement (%): 70  Station: -2  Baseline Rate: 135 bpm     FHR Category: Category I             Notes/comments:   Pt reports she feels pelvic cramping 3/10 on pain scale  Desires to try bath for comfort  Pt valeriano too often for cytotec and would not be do for pitocin for another hour   Reassess in 2 hours and if unchanged start pitocin  Pt agreeable with plan of care      Deejay Nunes MD 1/4/2020 11:38 PM

## 2020-01-06 VITALS
OXYGEN SATURATION: 96 % | BODY MASS INDEX: 30.39 KG/M2 | DIASTOLIC BLOOD PRESSURE: 78 MMHG | RESPIRATION RATE: 16 BRPM | HEIGHT: 64 IN | HEART RATE: 85 BPM | SYSTOLIC BLOOD PRESSURE: 118 MMHG | WEIGHT: 178 LBS | TEMPERATURE: 97.7 F

## 2020-01-06 LAB — RPR SER QL: NORMAL

## 2020-01-06 PROCEDURE — 99024 POSTOP FOLLOW-UP VISIT: CPT | Performed by: OBSTETRICS & GYNECOLOGY

## 2020-01-06 RX ADMIN — DOCUSATE SODIUM 100 MG: 100 CAPSULE, LIQUID FILLED ORAL at 08:29

## 2020-01-06 NOTE — PLAN OF CARE
Problem: POSTPARTUM  Goal: Experiences normal postpartum course  Description  INTERVENTIONS:  - Monitor maternal vital signs  - Assess uterine involution and lochia  Outcome: Progressing  Goal: Appropriate maternal -  bonding  Description  INTERVENTIONS:  - Identify family support  - Assess for appropriate maternal/infant bonding   -Encourage maternal/infant bonding opportunities  - Referral to  or  as needed  Outcome: Progressing  Goal: Establishment of infant feeding pattern  Description  INTERVENTIONS:  - Assess breast/bottle feeding  - Refer to lactation as needed  Outcome: Progressing  Goal: Incision(s), wounds(s) or drain site(s) healing without S/S of infection  Description  INTERVENTIONS  - Assess and document risk factors for skin impairment   - Assess and document dressing, incision, wound bed, drain sites and surrounding tissue  - Consider nutrition services referral as needed  - Oral mucous membranes remain intact  - Provide patient/ family education  Outcome: Progressing     Problem: PAIN - ADULT  Goal: Verbalizes/displays adequate comfort level or baseline comfort level  Description  Interventions:  - Encourage patient to monitor pain and request assistance  - Assess pain using appropriate pain scale  - Administer analgesics based on type and severity of pain and evaluate response  - Implement non-pharmacological measures as appropriate and evaluate response  - Consider cultural and social influences on pain and pain management  - Notify physician/advanced practitioner if interventions unsuccessful or patient reports new pain  Outcome: Progressing     Problem: INFECTION - ADULT  Goal: Absence or prevention of progression during hospitalization  Description  INTERVENTIONS:  - Assess and monitor for signs and symptoms of infection  - Monitor lab/diagnostic results  - Monitor all insertion sites, i e  indwelling lines, tubes, and drains  - Monitor endotracheal if appropriate and nasal secretions for changes in amount and color  - [place] appropriate cooling/warming therapies per order  - Administer medications as ordered  - Instruct and encourage patient and family to use good hand hygiene technique  - Identify and instruct in appropriate isolation precautions for identified infection/condition  Outcome: Progressing  Goal: Absence of fever/infection during neutropenic period  Description  INTERVENTIONS:  - Monitor WBC    Outcome: Progressing     Problem: SAFETY ADULT  Goal: Patient will remain free of falls  Description  INTERVENTIONS:  - Assess patient frequently for physical needs  -  Identify cognitive and physical deficits and behaviors that affect risk of falls    -  [place] fall precautions as indicated by assessment   - Educate patient/family on patient safety including physical limitations  - Instruct patient to call for assistance with activity based on assessment  - Modify environment to reduce risk of injury  - Consider OT/PT consult to assist with strengthening/mobility  Outcome: Progressing  Goal: Maintain or return to baseline ADL function  Description  INTERVENTIONS:  -  Assess patient's ability to carry out ADLs; assess patient's baseline for ADL function and identify physical deficits which impact ability to perform ADLs (bathing, care of mouth/teeth, toileting, grooming, dressing, etc )  - Assess/evaluate cause of self-care deficits   - Assess range of motion  - Assess patient's mobility; develop plan if impaired  - Assess patient's need for assistive devices and provide as appropriate  - Encourage maximum independence but intervene and supervise when necessary  - Involve family in performance of ADLs  - Assess for home care needs following discharge   - Consider OT consult to assist with ADL evaluation and planning for discharge  - Provide patient education as appropriate  Outcome: Progressing  Goal: Maintain or return mobility status to optimal level  Description  INTERVENTIONS:  - Assess patient's baseline mobility status (ambulation, transfers, stairs, etc )    - Identify cognitive and physical deficits and behaviors that affect mobility  - Identify mobility aids required to assist with transfers and/or ambulation (gait belt, sit-to-stand, lift, walker, cane, etc )  - [place] fall precautions as indicated by assessment  - Record patient progress and toleration of activity level on Mobility SBAR; progress patient to next Phase/Stage  - Instruct patient to call for assistance with activity based on assessment  - Consider rehabilitation consult to assist with strengthening/weightbearing, etc   Outcome: Progressing     Problem: DISCHARGE PLANNING  Goal: Discharge to home or other facility with appropriate resources  Description  INTERVENTIONS:  - Identify barriers to discharge w/patient and caregiver  - Arrange for needed discharge resources and transportation as appropriate  - Identify discharge learning needs (meds, wound care, etc )  - Arrange for interpretive services to assist at discharge as needed  - Refer to Case Management Department for coordinating discharge planning if the patient needs post-hospital services based on physician/advanced practitioner order or complex needs related to functional status, cognitive ability, or social support system  Outcome: Progressing

## 2020-01-06 NOTE — PROGRESS NOTES
Progress Note - OB/GYN   Roslyn Paula 22 y o  female MRN: [id number]  Unit/Bed#: L&D 304-01 Encounter: [id number]    Assessment:  22 y o  [place] s/p Spontaneous Vaginal Delivery Postpartum day  1    Plan:  1  Routine post-partum care  2  Encourage ambulation  3  Pain control as needed  4  Advance diet as tolerated  5  Rhogam not indicated  6  Contraception - not at this time  7  Anticipate discharge later today  Subjective/Objective   Chief Complaint: Postpartum day 1 of   Subjective:  Roslyn Paula is well appearing and has no complaints at this time  She denies any dizziness, nausea, vomiting, chest pain, shortness of breath, palpitations, or headaches  Pain: Well controlled with pain medication regimen  Tolerating PO: yes  Voiding: yes  Flatus: yes  BM: no  Ambulating: yes  Breastfeeding: Yes   Chest pain: no  Shortness of breath: no  Leg pain: no  Lochia: Decreasing    Objective:     Vitals: Blood pressure 109/55, pulse 76, temperature 98 5 °F (36 9 °C), temperature source Oral, resp  rate 16, height 5' 4" (1 626 m), weight 80 7 kg (178 lb), last menstrual period 2019, SpO2 95 %, currently breastfeeding      Intake/Output Summary (Last 24 hours) at 2020 0609  Last data filed at 2020 1245  Gross per 24 hour   Intake --   Output 736 ml   Net -736 ml     Physical Exam:     General: NAD  Cardiovascular: RRR, no murmur, nl S1/S2   Lungs: CTAB, non-labored breathing   Abdomen: Soft, no distension/rebound/guarding/tenderness   Fundus: Firm, non-tender, fundus: -1 umbilicus   Incision: None  Lower Extremities: Non-tender  Other: None    Lab, Imaging and other studies:     Recent Results (from the past 72 hour(s))   CBC    Collection Time: 20  8:15 PM   Result Value Ref Range    WBC 9 66 4 31 - 10 16 Thousand/uL    RBC 4 51 3 81 - 5 12 Million/uL    Hemoglobin 12 4 11 5 - 15 4 g/dL    Hematocrit 36 6 34 8 - 46 1 %    MCV 81 (L) 82 - 98 fL    MCH 27 5 26 8 - 34 3 pg    MCHC 33 9 31 4 - 37 4 g/dL    RDW 12 5 11 6 - 15 1 %    Platelets 632 647 - 328 Thousands/uL    MPV 10 9 8 9 - 12 7 fL   Type and screen    Collection Time: 01/04/20  9:45 PM   Result Value Ref Range    ABO Grouping O     Rh Factor Positive     Antibody Screen Negative     Specimen Expiration Date 20200107    Blood gas, arterial, cord    Collection Time: 01/05/20 10:08 AM   Result Value Ref Range    pH, Cord Art 7 348 7 230 - 7 430    pCO2, Cord Art 40 6 30 0 - 60 0    pO2, Cord Art 32 0 (H) 5 0 - 25 0 mm HG    HCO3, Cord Art 21 8 17 3 - 27 3 mmol/L    Base Exc, Cord Art -3 5 (L) 3 0 - 11 0 mmol/L    O2 Content, Cord Art 17 6 ml/dl    O2 Hgb, Arterial Cord 75 6 %   Blood gas, venous, cord    Collection Time: 01/05/20 10:08 AM   Result Value Ref Range    pH, Cord Wayne 7 340 7 190 - 7 490    pCO2, Cord Wayne 41 3 27 0 - 43 0 mm HG    pO2, Cord [place] 26 3 15 0 - 45 0 mm HG    HCO3, Cord Wayne 21 8 12 2 - 28 6 mmol/L    Base Exc, Cord [place] -3 8 (L) 1 0 - 9 0 mmol/L    O2 Cont, Cord Wayne 14 0 mL/dL    O2 HGB,VENOUS CORD 61 4 %     Meds:    docusate sodium 100 mg Oral BID       acetaminophen 650 mg Q6H PRN   benzocaine-menthol-lanolin-aloe  4x Daily PRN   calcium carbonate 1,000 mg Daily PRN   hydrocortisone 1 application PRN   ibuprofen 600 mg Q6H PRN   ondansetron 4 mg Q8H PRN   witch hazel-glycerin 1 pad PRN     John Paul MD, Family Medicine - OB/GYN, PGY1  Date: 1/6/2020  Time: 6:09 AM

## 2020-01-06 NOTE — LACTATION NOTE
This note was copied from a baby's chart  Met with mother to go over discharge breastfeeding booklet including the  feeding log since birth for the first week  Emphasized 8 or more (12) feedings in a 24 hour period, what to expect for the number of diapers per day of life and the progression of properties of the  stooling pattern  Discussed s/s that breastfeeding is going well after day 4 and when to get help from a pediatrician or lactation support person after day 4  Booklet included Breast Pumping Instructions, When You Go Back to Work or School, and Breastfeeding Resources for after discharge including access to the number for the [employer]  Assisted mom with breastfeeding because she is sore  Demo  football hold, and how to get a deep latch  Baby latched well  Baby was gulping milk  Reassured mom she is doing well

## 2020-01-06 NOTE — DISCHARGE INSTRUCTIONS
Vaginal Delivery   WHAT YOU NEED TO KNOW:   A vaginal delivery occurs when your baby is born through your vagina (birth canal)  DISCHARGE INSTRUCTIONS:   Seek care immediately if:   · Your leg feels warm, tender, and painful  It may look swollen and red  · You have a fever  · You are urinating very little, or not at all  · You have heavy vaginal bleeding that fills 1 or more sanitary pads in 1 hour  · You feel weak, dizzy, or faint  Contact your healthcare provider if:   · Your abdominal or perineal pain does not go away, or gets worse  · You feel depressed  · You have questions or concerns about your condition or care  Medicines:  · NSAIDs , such as ibuprofen, help decrease swelling, pain, and fever  This medicine is available with or without a doctor's order  NSAIDs can cause stomach bleeding or kidney problems in certain people  If you take blood thinner medicine, always ask your healthcare provider if NSAIDs are safe for you  Always read the medicine label and follow directions  · Stool softeners  make it easier for you to have a bowel movement  You may need this medicine to treat or prevent constipation  · Take your medicine as directed  Contact your healthcare provider if you think your medicine is not helping or if you have side effects  Tell him or her if you are allergic to any medicine  Keep a list of the medicines, vitamins, and herbs you take  Include the amounts, and when and why you take them  Bring the list or the pill bottles to follow-up visits  Carry your medicine list with you in case of an emergency  Follow up with your healthcare provider:  Most women need to return 6 weeks after a vaginal delivery  Ask your healthcare provider how to care for your wounds or stitches, if you have them  Write down your questions so you remember to ask them during your visits  Activity:  Rest as much as possible  Try to keep all activities short   You may be able to do some exercise soon after you have your baby  Talk with your healthcare provider before you start exercising  If you work outside the home, ask when you can return to your job  Kegel exercises:  Kegel exercises may help your vaginal and rectal muscles heal faster  You can do Kegel exercises by tightening and relaxing the muscles around your vagina  Kegel exercises help make the muscles stronger  Breast care:  When your milk comes in, your breasts may feel full and hard  Ask how to care for your breasts, even if you are not breastfeeding  Constipation:  You may have constipation for a period of time after you have your baby  Do not try to push the bowel movement out if it is too hard  High-fiber foods and extra liquids can help you prevent constipation  Examples of high-fiber foods are fruit and bran  Prune juice and water are good liquids to drink  You may also be told to take over-the-counter fiber and stool softener medicines  Take these items as directed  Ask how to prevent or treat hemorrhoids  Perineum care: Your perineum is the area between your vagina and anus  Keep the area clean and dry  This will help it heal and prevent infection  Wash the area gently with soap and water when you bathe or shower  Rinse your perineum with warm water after you urinate or have a bowel movement  Your healthcare provider may suggest you use a warm sitz bath to help decrease pain  To take a sitz bath, fill a bathtub with 4 to 6 inches of warm water  You may also use a sitz bath pan that fits inside the toilet  Sit in the sitz bath for 20 minutes  Do this 2 to 3 times a day, or as directed  The warm water can help decrease pain and swelling  Vaginal discharge: You will have vaginal discharge, called lochia, after your delivery  The lochia is red or dark brown with clots for 1 to 3 days after the birth  The amount will decrease and turn pale pink or brown for 3 to 10 days  It will turn white or yellow on the 10th or 14th day  Lochia is usually gone within 3 weeks  Use a sanitary pad rather than a tampon to prevent a vaginal infection  You will have lochia for up to 3 weeks after your baby is born  Monthly periods: Your period may start again within 7 to 9 weeks after your baby is born  If you are breastfeeding, it may take longer for your period to start again  You can still get pregnant again even though you do not have your monthly period  Talk with your healthcare provider about a birth control method if you do not want to get pregnant  Mood changes: Many new mothers have some kind of mood changes after delivery  Some of these changes occur because of lack of sleep, hormone changes, and caring for a new baby  Some mood changes can be more serious, such as postpartum depression  Talk with your healthcare provider if you feel unable to care for yourself or your baby  Sexual activity:  Do not have sex until your healthcare provider says it is okay  You may notice you have a decreased desire for sex, or sex may be painful  You may need to use a vaginal lubricant (gel) to help make sex more comfortable  © 2017 [facility] Information is for End User's use only and may not be sold, redistributed or otherwise used for commercial purposes  All illustrations and images included in CareNotes® are the copyrighted property of A D A M , Inc  or Lalo Cordero  The above information is an  only  It is not intended as medical advice for individual conditions or treatments  Talk to your doctor, nurse or pharmacist before following any medical regimen to see if it is safe and effective for you  Self Care After Delivery   AMBULATORY CARE:   The postpartum period  is the period of time from delivery to about 6 weeks  During this time you may experience many physical and emotional changes  It is important to understand what is normal and when you need to call your healthcare provider   It is also important to know how to care for yourself during this time  Call 911 for any of the following:   · You soak through 1 pad in 15 minutes, have blurry vision, clammy or pale skin, and feel faint  · You faint or lose consciousness  · Your heart is beating faster than normal      · You have trouble breathing  · You cough up blood  Seek care immediately if:   · You soak through 1 or more pads in an hour, or pass blood clots larger than a quarter from your vagina  · Your leg is painful, red, and larger than normal      · You have severe abdominal pain  · You have a bad headache or changes in your vision  · Your episiotomy or C section incision is red, swollen, bleeding, or draining pus  · Your incision comes apart  · You see or hear things that are not there, or have thoughts of harming yourself or your baby  Contact your obstetrician or midwife if:   · You have a fever  · You have new or worsening pain in your abdomen or vagina  · You continue to have the baby blues 10 days after you deliver  · You have trouble sleeping  · You have foul-smelling discharge from your vagina  · You have pain or burning when you urinate  · You do not have a bowel movement for 3 days or more  · You have nausea or are vomiting  · You have hard lumps or red streaks over your breasts  · You have cracked nipples or bleed from your nipples  · You have questions or concerns about your condition or care  Physical changes: The following are normal changes after you give birth:  · Pain in the area between your anus and vagina    · Breast pain    · Constipation or hemorrhoids    · Hot or cold flashes    · Vaginal bleeding or discharge    · Mild to moderate abdominal cramping    · Difficulty controlling bowel movements or urine  Emotional changes: The following are symptoms of the baby blues, or normal emotions after you give birth   The changes in your emotions may be caused by a drop in hormone levels after you deliver  If these symptoms last longer than 1 to 2 weeks after you give birth, you may have postpartum depression  · Feeling irritable    · Feeling sad    · Crying for no reason    · Feeling anxious  Breast care for nursing mothers: You may have sore breasts for 3 to 6 days after you give birth  This happens as your milk begins to fill your breasts  You may also have sore breasts if you do not breastfeed frequently  Do the following to care for your breasts:  · Apply a moist, warm, compress to your breast as directed  This may help soothe your breasts  Make sure the washcloth is not too hot before you apply it to your breast      · Nurse your baby or pump your milk frequently  This may prevent clogged milk ducts  Ask your healthcare provider how often to nurse or pump  · Massage your breasts as directed  This may help increase your milk flow  Gently rub your breasts in a circular motion before you breastfeed  You may need to gently squeeze your breast or nipple to help release milk  You can also use a breast pump to help release milk from your breast      · Wash your breasts with warm water only  Do not put soap on your nipples  Soap may cause your nipples to become dry  · Apply lanolin cream to your nipples as directed  Lanolin cream may add moisture to your skin and prevent nipple dryness  Always  wash off lanolin cream with warm water before you breastfeed  · Place pads in your bra  Your nipples may leak milk when you are not breastfeeding  You can place pads inside of your bra to help prevent leaking onto your clothing  Ask your healthcare provider where to purchase bra pads  · Get breastfeeding support if needed  There are healthcare providers who can answer questions about breastfeeding and provide you with support  Ask your healthcare provider who you can contact if you need breastfeeding support    Breast care for non-nursing mothers:  Milk will fill your breasts even if you bottle feed your baby  Do the following to help stop your milk from filling your breasts and causing pain:  · Wear a bra with support at all times  A sports bra or a tight-fitting bra will help stop your milk from coming in  · Apply ice on each breast for 15 to 20 minutes every hour or as directed  Use an ice pack, or put crushed ice in a plastic bag  Cover it with a towel  Ice helps your milk ducts shrink  · Keep your breasts away from warm water  Warm water will make it easier for milk to fill your breasts  Stand with your breasts away from warm water in the shower  · Limit how much you touch your breasts  This will prevent them from filling with milk  Perineum care: Your perineum is the area between your rectum and vagina  It is normal to have swelling and pain in this area after you give birth  If you had an episiotomy, your healthcare provider may give you special instructions  · Clean your perineum after you use the bathroom  This may prevent infection and help with healing  Use a spray bottle with warm water to clean your perineum  You may also gently spray warm water against your perineum when you urinate  Always wipe front to back  · Take a sitz bath as directed  A sitz bath may help relieve swelling and pain  Fill your bath tub or bucket with water up to your hips and sit in the water  Use cold water for 2 days after you deliver  Then use warm water  Ask your healthcare provider for more information about a sitz bath  · Apply ice packs for the first 24 hours or as directed  Use a plastic glove filled with ice or buy an ice pack  Wrap the ice pack or plastic glove in a small towel or wash cloth  Place the ice pack on your perineum for 20 minutes at a time  · Sit on a donut-shaped pillow  This may relieve pressure on your perineum when you sit  · Use wipes with medicine or take pills as directed    Your healthcare provider may tell you to use witch hazel pads  You can place witch hazel pads in the refrigerator before you apply them to your perineum  He may also tell you to take NSAIDs  Ask your healthcare provider how often to take pills or use wipes with medicine  · Do not go swimming or take tub baths for 4 to 6 weeks or as directed  This will help prevent an infection in your vagina or uterus  Bowel and bladder care: It may take 3 to 5 days to have a bowel movement after you deliver your baby  You can do the following to prevent or manage constipation, and get control of your bowel or bladder:  · Take stool softeners as directed  A stool softener is medicine that will make your bowel movements softer  This may prevent or relieve constipation  A stool softener may also make bowel movements less painful  · Drink plenty of liquids  Ask how much liquid to drink each day and which liquids are best for you  Liquids may help prevent constipation  · Eat foods high in fiber  Examples include fruits, vegetables, grains, beans, and lentils  Ask your healthcare provider how much fiber you need each day  Fiber may prevent constipation  · Do Kegel exercises as directed  Kegel exercises will help strengthen the muscles that control bowel movements and urination  Ask your healthcare provider for more information on Kegel exercises  · Apply cold compresses or medicine to hemorrhoids as directed  This may relieve swelling and pain  Your healthcare provider may tell you to apply ice or wipes with medicine to your hemorrhoids  He may also tell you to use a sitz bath  Ask your healthcare for more information on how to manage hemorrhoids  Nutrition:  Good nutrition is important in the postpartum period  It will help you return to a healthy weight, increase your energy levels, and prevent constipation  It will also help you get enough nutrients and calories if you are going to breastfeed your baby  · Eat a variety of healthy foods    Healthy foods include fruits, vegetables, whole-grain breads, low-fat dairy products, beans, lean meats, and fish  You may need 500 to 700 additional calories each day if you breastfeed your baby  You may also need extra protein  · Limit foods with added sugar and high amounts of fat  These foods are high in calories and low in healthy nutrients  Read food labels so you know how much sugar and fat is in the food you want to eat  · Drink 8 to 10 glasses of water per day  Water will help you make plenty of milk for your baby  It will also help prevent constipation  Drink a glass of water every time you breastfeed your baby  · Take vitamins as directed  Ask your healthcare provider what vitamins you need  · Limit caffeine and alcohol if you are breastfeeding  Caffeine and alcohol can get into your breast milk  Caffeine and alcohol can make your baby fussy  They can also interfere with your baby's sleep  Ask your healthcare provider if you can drink alcohol or caffeine  Rest and sleep: You may feel very tired in the postpartum period  Enough sleep will help you heal and give you energy to care for your baby  The following may help you get sleep and rest:  · Nap when your baby naps  Your baby may nap several times during the day  Get rest during this time  · Limit visitors  Many people may want to see you and your baby  Ask friends or family to visit on different days  This will give you time to rest      · Do not plan too much for one day  Put off household chores so that you have time to rest  Gradually do more each day  · Ask for help from family, friends, or neighbors  Ask them to help you with laundry, cleaning, or errands  Also ask someone to watch the baby while you take a nap or relax  Ask your partner to help with the care of your baby  Pump some of your breast milk so your partner can feed your baby during the night    Exercise after delivery:  Wait until your healthcare provider says it is okay to exercise  Exercise can help you lose weight, increase your energy levels, and manage your mood  It can also prevent constipation and blood clots  Start with gentle exercises such as walking  Do more as you have more energy  You may need to avoid abdominal exercises for 1 to 2 weeks after you deliver  Talk to your healthcare provider about an exercise plan that is right for you  Sexual activity after delivery:   · Do not have sex until your healthcare provider says it is okay  You may need to wait 4 to 6 weeks before you have sex  This may prevent infection and allow time to heal      · Your menstrual cycle may begin as soon as 3 weeks after you deliver  Your period may be delayed if you breastfeed your baby  You can become pregnant before you get your first postpartum period  Talk to your healthcare provider about birth control that is right for you  Some types of birth control are not safe during breastfeeding  For support and more information:  Join a support group for new mothers  Ask for help from family and friends with chores, errands, and care of your baby  · Office of Women's Health,  Department of Health and Human Services  [address], [id number] [place] [address]  [address], [id number] [place] [address]  Phone: [phone]  Web Address: www womenshealth gov  · [facility] Postpartum [address] , [address] [place] [address]  [facility] , [address] [place] [address]  Web Address: [employer] be  org/pregnancy/[email or web address]-care  aspx  Follow up with your obstetrician or midwife as directed: You will need to follow up with your healthcare provider in 2 to 6 weeks after delivery  Write down your questions so you remember to ask them at your visits  © 2017 [employer] Information is for End User's use only and may not be sold, redistributed or otherwise used for commercial purposes   All illustrations and images included in CareNotes® are the copyrighted property of A D A M , Inc  or Lalo Cordero  The above information is an  only  It is not intended as medical advice for individual conditions or treatments  Talk to your doctor, nurse or pharmacist before following any medical regimen to see if it is safe and effective for you  Postpartum Depression   WHAT YOU NEED TO KNOW:   Postpartum depression is a mood disorder that occurs after giving birth  A mood is an emotion or a feeling  Moods affect your behavior and how you feel about yourself and life in general  Depression is a sad mood that you cannot control  Women often feel sad, afraid, or nervous after their baby is born  These feelings are called postpartum blues or baby blues, and they usually go away in 1 to 2 weeks  With postpartum depression, these symptoms get worse and continue for more than 2 weeks  Postpartum depression is a serious condition that affects your daily activities and relationships  DISCHARGE INSTRUCTIONS:   Medicines:   · Antidepressants: These medicines are given to decrease or stop the symptoms of depression  You usually need to take antidepressants for several weeks before you begin to feel better  Do not stop taking antidepressants unless your healthcare provider tells you to  Healthcare providers may try a different antidepressant if one type does not work  · Take your medicine as directed  Contact your healthcare provider if you think your medicine is not helping or if you have side effects  Tell him of her if you are allergic to any medicine  Keep a list of the medicines, vitamins, and herbs you take  Include the amounts, and when and why you take them  Bring the list or the pill bottles to follow-up visits  Carry your medicine list with you in case of an emergency    Follow up with your healthcare provider or psychiatrist as directed:  Write down your questions so you remember to ask them during your visits  Psychotherapy:  During therapy, you will talk with healthcare providers about how to cope with your feelings and moods  This can be done alone or in a group  It may also be done with family members or your partner  Self-care:   · Rest:  Do not try to do everything all at the same time  Do only what is needed and let other things wait until later  Ask your family or friends for help, especially if you have other children  Ask your partner to help with night feedings or other baby care  Try to sleep when the baby naps  · Get emotional support:  Share your feelings with your partner, a friend, or another mother  · Take care of yourself:  Shower and dress each day  Do not skip meals  Try to get out of the house a little each day  Get regular exercise  Eat a healthy diet  Avoid alcohol because it can make your depression worse  Do not isolate yourself  Go for a walk or meet with a friend  It is also important that you have some time by yourself each day  For support and more information:   · [address] [place] [address], Public Information & Communication Branch  [address], [address], [address]  [place] [address]   Phone: [phone]  Phone: [phone]  Web Address: [facility] tn  Contact your healthcare provider or psychiatrist if:   · You cannot make it to your next visit  · Your depression does not get better with treatment or it gets worse  · You have questions or concerns about your condition or care  Return to the emergency department if:   · You think about hurting or killing yourself, your baby, or someone else  · You feel like other people want to hurt you  · You hear voices telling you to hurt yourself or your baby  © 2017 [address] [employer]  Information is for End User's use only and may not be sold, redistributed or otherwise used for commercial purposes   All illustrations and images included in [employer] [facility] are the copyrighted property of A D A M , Inc  or Lalo Cordero  The above information is an  only  It is not intended as medical advice for individual conditions or treatments  Talk to your doctor, nurse or pharmacist before following any medical regimen to see if it is safe and effective for you  Postpartum Bleeding   WHAT YOU NEED TO KNOW:   What is postpartum bleeding? Postpartum bleeding is vaginal bleeding after childbirth  This bleeding is normal, whether your baby was born vaginally or by   It contains blood and the tissue that lined the inside of your uterus when you were pregnant  What should I expect with postpartum bleeding? Postpartum bleeding usually lasts at least 10 days, and may last longer than 6 weeks  Your bleeding may range from light (barely staining a pad) to heavy (soaking a pad in 1 hour)  Usually, you have heavier bleeding right after childbirth, which slows over the next few weeks until it stops  The bleeding is red or dark brown with clots for the first 1 to 3 days  It then turns pink for several days, and then becomes a white or yellow discharge until it ends  When should I contact my healthcare provider? · Your bleeding increases, or you have heavy bleeding that soaks a pad in 1 hour for 2 hours in a row  · You pass large blood clots  · You are breathing faster than normal, or your heart is beating faster than normal     · You are urinating less than usual, or not at all  · You feel dizzy  · You have questions or concerns about your condition or care  When should I seek immediate care or call 911? · You are suddenly short of breath and feel lightheaded  · You have sudden chest pain  CARE AGREEMENT:   You have the right to help plan your care  Learn about your health condition and how it may be treated  Discuss treatment options with your caregivers to decide what care you want to receive   You always have the right to refuse treatment  The above information is an  only  It is not intended as medical advice for individual conditions or treatments  Talk to your doctor, nurse or pharmacist before following any medical regimen to see if it is safe and effective for you  © 2017 [address] [employer] Information is for End User's use only and may not be sold, redistributed or otherwise used for commercial purposes  All illustrations and images included in CareNotes® are the copyrighted property of [employer] A M , Inc  or Lalo Cordero  Breast Care for the Breast Feeding Mother   WHAT YOU SHOULD KNOW:   Your breasts will go through normal changes while you are breastfeeding  Sometimes breast and nipple problems can develop while you are breastfeeding  Learn about changes that are normal and those that may be a problem  Breast care can help you prevent and manage problems so you and your baby can enjoy the benefits of breastfeeding  INSTRUCTIONS:   Breast changes while you are breastfeeding:   · For the first few days after your baby is born, your body makes a small amount of breast milk (colostrum)  Within about 2 to 5 days, your body will begin making mature milk  It may take up to 10 days or longer for mature milk to come in  When your mature milk comes in, your breasts will become full and firm  They may feel tender  · Breastfeeding your baby will decrease the full feeling in your breasts  You may feel a tingly sensation during feedings as milk is released from your breasts  This is called the milk let-down reflex  After 7 or more days, the fullness may feel like it has decreased  Your nipples should look the same as they did before you started breastfeeding  Breasts that feel full before and empty after breastfeeding are signs that breastfeeding is going well    Breast problems that can occur while you are breastfeeding:   · Nipple soreness  may occur when you begin to breastfeed your baby  You may also have nipple soreness if your baby is not latched on to your breast correctly  Correct positioning and latch-on may decrease or stop the pain in your nipples  Work with your caregivers to help your baby latch on correctly  It may also be helpful to place warm, wet compresses on your nipples to help decrease pain  · Plugged milk ducts  may cause painful breast lumps  Plugged ducts may be caused by not emptying your breasts completely during feedings  When your baby pauses during breastfeeding, massage and gently squeeze your breast  Gentle massage may unplug a blocked milk duct  Pump out any milk left in your breasts after your baby is done breastfeeding  Avoid wearing tight tops, tight bras, or under-wire bras, because they may put pressure on your breasts  · Engorgement  may occur as your milk comes in soon after you begin breastfeeding  Engorgement may cause your breasts to become swollen and painful  Your breasts may also become engorged if you miss a feeding or you do not breastfeed on demand  The best way to decrease engorgement symptoms is to empty your breasts by feeding your baby often  Engorgement can make it hard for your baby to latch on to your breast  If this happens, express a small amount of milk and then have your baby latch on  Cold compresses, gel packs, or ice packs on your breasts can help decrease pain and swelling  Ask your caregiver how often and how long you should use cold, or ice packs  · A breast infection called mastitis  can develop if you have plugged milk ducts or engorgement  Mastitis causes your breasts to become red, swollen, and painful  You may also have flu-like symptoms, such as chills and a fever  Place heat on your breasts to help decrease the pain  You may want to place a moist, warm cloth on the painful breast or both of your breasts  Ask how often to do this   Your primary healthcare provider [facility]) may suggest that you take an NSAID, such as ibuprofen, to decrease pain and swelling  He may also order antibiotics to treat mastitis  Ask about feeding your baby when you have a breast infection  How to help prevent or manage breast problems while you are breastfeeding:   · Learn how to position your baby and latch him on correctly  To latch your baby correctly to your breast, make sure that his mouth covers most of your areola (dark area around your nipple)  He should not be attached only to the nipple  Your baby is latched on well if you feel comfortable and do not feel pain  A correct latch helps him get enough milk and can help to prevent sore nipples and other breast problems  There are several breastfeeding positions that you can try  Find the position that works best for you and your baby  Ask your caregiver for more information about how to hold and breastfeed your baby  · Prevent biting  Your baby may get teeth at about 1to 3months of age  To help prevent biting, break his suction once he is finished or if he has fallen asleep  To break his suction, slip a finger into the side of his mouth  If your baby bites you, respond with surprise or unhappiness  Offer praise when he does not bite you  · Breastfeed your baby regularly  Feed your baby 8 to 12 times a day  You may need to wake up your baby at night to feed him  It is okay to feed from 1 or both breasts at each feeding  Your baby should breastfeed from both breasts equally over the course of a day  If your baby only feeds from 1 side during a feeding, offer your other breast to him first for the next feeding  · Schedule and keep follow-up visits  Talk to your baby's pediatrician or your PHP during follow-up visits if you have breast problems  Caregivers may suggest that you, or you and your partner, attend classes on breastfeeding  You also may want to join a breastfeeding support group  Caregivers may suggest that you see a lactation consultant   This is a caregiver who can help you with breastfeeding  Contact your PHP if:   · You have a fever and chills  · You have body aches and you feel like you do not have any energy  · One or both of your breasts is red, swollen or hard, painful, and feels warm or hot  · You have breast engorgement that does not get better within 24 hours  · You see or feel a lump in your breast that hurts when you touch it  · You have nipple pain during breastfeeding or between feedings  · Your nipples are red, dry, cracked, bleeding, or they have scabs on them  · You have questions or concerns about your condition or care  © 2014 [address] is for End User's use only and may not be sold, redistributed or otherwise used for commercial purposes  All illustrations and images included in CareNotes® are the copyrighted property of [employer] D [employer] M [employer] Inc  or Lalo Cordero  The above information is an  only  It is not intended as medical advice for individual conditions or treatments  Talk to your doctor, nurse or pharmacist before following any medical regimen to see if it is safe and effective for you

## 2020-01-06 NOTE — LACTATION NOTE
This note was copied from a baby's chart  Mother verbalized breastfeeding is going well, but she is sore  I Enc mom to call for assistance next feeding to assess her latch,phone # given

## 2020-01-13 LAB — PLACENTA IN STORAGE: NORMAL

## 2020-01-23 ENCOUNTER — OFFICE VISIT (OUTPATIENT)
Dept: OBGYN CLINIC | Facility: CLINIC | Age: 25
End: 2020-01-23
Payer: COMMERCIAL

## 2020-01-23 VITALS
SYSTOLIC BLOOD PRESSURE: 116 MMHG | BODY MASS INDEX: 27.91 KG/M2 | DIASTOLIC BLOOD PRESSURE: 74 MMHG | HEART RATE: 73 BPM | WEIGHT: 162.6 LBS | OXYGEN SATURATION: 99 %

## 2020-01-23 PROCEDURE — 99214 OFFICE O/P EST MOD 30 MIN: CPT | Performed by: OBSTETRICS & GYNECOLOGY

## 2020-01-23 RX ORDER — SERTRALINE HYDROCHLORIDE 25 MG/1
25 TABLET, FILM COATED ORAL DAILY
Qty: 30 TABLET | Refills: 1 | Status: SHIPPED | OUTPATIENT
Start: 2020-01-23 | End: 2020-02-25 | Stop reason: SDUPTHER

## 2020-01-23 NOTE — PROGRESS NOTES
Patient is a 22 y o  Dinora Dixon with Patient's last menstrual period was 2019 (exact date)  who presents requesting evaluation of suspected postpartum depression  Pt underwent  on 2020  Pt reports she is anxious all the time and cries frequently despite excellent support from her  and her mother  She gets upset that she needs help caring for the baby and wonders" why I am not good enough" to take care of her herself  She denies any homicidal or suicidal ideation  She also reports she will not leave the house because it brings her anxiety to worry about what can happen to the baby when she cannot control the environment around her  Pt is tearful and crying throughout her interview  Pt reports she had anxiety as a teenager but never had it treated  Advised pt she likely has PPD exacerbating her anxiety and she may benefit from Zoloft in addition [facility]  We reviewed her symptoms may worsen before they become better and she is advised to call with any HI or SI  Pt agreeable  PPD score today 19  Proper use of zoloft reviewed  Past Medical History:   Diagnosis Date    Post partum depression 2020    Varicella        Past Surgical History:   Procedure Laterality Date    KIDNEY SURGERY Bilateral     bilateral ureter reimplantation, age 10       OB History   [de-identified] Para Term  AB Living   1 1 1 0 0 1   SAB TAB Ectopic Multiple Live Births   0 0 0 0 1      # Outcome Date GA Lbr Rik/2nd Weight Sex Delivery Anes PTL Lv   1 Term 20 39w4d / 01:13 3230 g (7 lb 1 9 oz) F Vag-Spont EPI  SUZANNE             Current Outpatient Medications:     Prenatal Vit-Min-FA-Fish Oil ([facility] PRENATAL GUMMY) 0 4-113 5 MG CHEW, Chew, Disp: , Rfl:     sertraline (ZOLOFT) 25 mg tablet, Take 1 tablet (25 mg total) by mouth daily Take 1/2 tablet daily for the first week and then take one tablet daily  , Disp: 30 tablet, Rfl: 1    Allergies   Allergen Reactions    Pollen Extract Social History     Socioeconomic History    Marital status: /Civil Union     Spouse name: Not on file    Number of children: Not on file    Years of education: Not on file    Highest education level: Not on file   Occupational History    Not on file   Social Needs    Financial resource strain: Not on file    Food insecurity:     Worry: Not on file     Inability: Not on file    Transportation needs:     Medical: Not on file     Non-medical: Not on file   Tobacco Use    Smoking status: Never Smoker    Smokeless tobacco: Never Used   Substance and Sexual Activity    Alcohol use: Not Currently    Drug use: Never    Sexual activity: Yes     Partners: Male     Birth control/protection: None   Lifestyle    Physical activity:     Days per week: Not on file     Minutes per session: Not on file    Stress: Not on file   Relationships    Social connections:     Talks on phone: Not on file     Gets together: Not on file     Attends Taoist service: Not on file     Active member of club or organization: Not on file     Attends meetings of clubs or organizations: Not on file     Relationship status: Not on file    Intimate partner violence:     Fear of current or ex partner: Not on file     Emotionally abused: Not on file     Physically abused: Not on file     Forced sexual activity: Not on file   Other Topics Concern    Not on file   Social History Narrative    Not on file       Family History   Problem Relation Age of Onset    Asthma Father     Bronchiolitis Father     Depression Father     Heart defect Sister     Supraventricular tachycardia Sister         cathecholaminergic polymorphic    Breast cancer Maternal Grandmother     Diabetes Maternal Grandmother     Prostate cancer Paternal Grandfather     Alcohol abuse Paternal Grandfather     Cancer Paternal Grandfather         lung and/or prostate    Thyroid disease Paternal Grandmother     Thyroid disease Paternal Aunt     Hypertension Family     Ovarian cancer Neg Hx     Uterine cancer Neg Hx        Review of Systems   Constitutional: Positive for fatigue  Negative for chills, fever and unexpected weight change  HENT: Negative for congestion, mouth sores and sore throat  Respiratory: Negative for cough, chest tightness, shortness of breath and wheezing  Cardiovascular: Negative for chest pain and palpitations  Gastrointestinal: Negative for abdominal distention, abdominal pain, constipation, diarrhea, nausea and vomiting  Endocrine: Negative for cold intolerance and heat intolerance  Genitourinary: Positive for vaginal bleeding  Negative for dyspareunia, dysuria, genital sores, menstrual problem, pelvic pain, vaginal discharge and vaginal pain  Musculoskeletal: Negative for arthralgias  Skin: Negative for color change and rash  Neurological: Negative for dizziness, light-headedness and headaches  Hematological: Negative for adenopathy  Psychiatric/Behavioral: Positive for dysphoric mood and sleep disturbance  Negative for self-injury and suicidal ideas  The patient is nervous/anxious  Blood pressure 116/74, pulse 73, weight 73 8 kg (162 lb 9 6 oz), last menstrual period 04/03/2019, SpO2 99 %, currently breastfeeding  and Body mass index is 27 91 kg/m²  Physical Exam   Constitutional: She is oriented to person, place, and time  She appears well-developed and well-nourished  HENT:   Head: Normocephalic and atraumatic  Eyes: Conjunctivae and EOM are normal    Neck: Normal range of motion  Pulmonary/Chest: Effort normal    Musculoskeletal: Normal range of motion  She exhibits no edema or tenderness  Neurological: She is alert and oriented to person, place, and time  Skin: Skin is warm  No rash noted  No erythema  Psychiatric: Judgment and thought content normal    Pt tearful and crying multiple times during her visit              A/P:  Pt is a 22 y o  Tara Starr with      Diagnoses and all orders for this visit:    Post partum depression  -     sertraline (ZOLOFT) 25 mg tablet; Take 1 tablet (25 mg total) by mouth daily Take 1/2 tablet daily for the first week and then take one tablet daily  F/U 3 weeks when due for pp examination

## 2020-02-10 ENCOUNTER — TELEPHONE (OUTPATIENT)
Dept: OBGYN CLINIC | Facility: CLINIC | Age: 25
End: 2020-02-10

## 2020-02-10 NOTE — TELEPHONE ENCOUNTER
She can be cleared for 6 weeks post delivery even if that is before my appointment with her if she has no concerns

## 2020-02-10 NOTE — TELEPHONE ENCOUNTER
Patient called asking for Yeimi Toribio and to be cleared for work on 02/21/2020, however her post-partum visit was pushed back to 02/25 due to provider availability  She is asking if she can be cleared sooner if possible

## 2020-02-11 NOTE — TELEPHONE ENCOUNTER
Patient returning my call stating her return to work note can be faxed to [phone]  Printed and faxed for patient

## 2020-02-25 ENCOUNTER — POSTPARTUM VISIT (OUTPATIENT)
Dept: OBGYN CLINIC | Facility: CLINIC | Age: 25
End: 2020-02-25

## 2020-02-25 VITALS
WEIGHT: 164.6 LBS | BODY MASS INDEX: 28.25 KG/M2 | HEART RATE: 68 BPM | SYSTOLIC BLOOD PRESSURE: 100 MMHG | OXYGEN SATURATION: 98 % | DIASTOLIC BLOOD PRESSURE: 80 MMHG

## 2020-02-25 DIAGNOSIS — Z30.09 CONTRACEPTIVE EDUCATION: ICD-10-CM

## 2020-02-25 PROBLEM — O35.8XX0 ECHOGENIC FOCUS OF HEART OF FETUS AFFECTING ANTEPARTUM CARE OF MOTHER: Status: RESOLVED | Noted: 2019-08-19 | Resolved: 2020-02-25

## 2020-02-25 PROBLEM — O35.BXX0 ECHOGENIC FOCUS OF HEART OF FETUS AFFECTING ANTEPARTUM CARE OF MOTHER: Status: RESOLVED | Noted: 2019-08-19 | Resolved: 2020-02-25

## 2020-02-25 PROCEDURE — 99024 POSTOP FOLLOW-UP VISIT: CPT | Performed by: OBSTETRICS & GYNECOLOGY

## 2020-02-25 RX ORDER — SERTRALINE HYDROCHLORIDE 25 MG/1
25 TABLET, FILM COATED ORAL DAILY
Qty: 30 TABLET | Refills: 2 | Status: SHIPPED | OUTPATIENT
Start: 2020-02-25 | End: 2020-04-23 | Stop reason: SDUPTHER

## 2020-02-25 NOTE — PROGRESS NOTES
Patient is a 22 y o  Kat Pennington with Patient's last menstrual period was 2019 (exact date)  who presents for post partum examination s/p  on 2020  Pt is without complaints  She reports overall she is feeling well  She reports her lochia has stopped and she has not had a menses as of yet  Pt reports she is Breast feeding  Pt denies si/sx of ppd and scored a 6 on her ppd-E today  She previously scored a 19 and was started on Zoloft  She reports that she is doing very well  She has not cried at all since starting her medications and is happy again  She is very happy with the results and desires to continue  Pt reports she has been sexually active without complication  She desires Mirena for contraception  We reviewed the risks of abnormal bleeding, infection, uterine perforation, failure and ectopic pregnancy and the pt desires to proceed  Reports she will use condoms as a bridge until IUD inserted  Pamphlets given  Past Medical History:   Diagnosis Date    Echogenic focus of heart of fetus affecting antepartum care of mother 2019    Patient declined genetic testing    Rpt US at [facility]  60  partum depression 2020    Varicella        Past Surgical History:   Procedure Laterality Date    KIDNEY SURGERY Bilateral     bilateral ureter reimplantation, age 10       OB History    Para Term  AB Living   1 1 1 0 0 1   SAB TAB Ectopic Multiple Live Births   0 0 0 0 1      # Outcome Date GA Lbr Rik/2nd Weight Sex Delivery Anes PTL Lv   1 Term 20 39w4d / 01:13 3230 g (7 lb 1 9 oz) F Vag-Spont EPI  SUZANNE             Current Outpatient Medications:     Prenatal Vit-Min-FA-Fish Oil ([facility] PRENATAL GUMMY) 0 4-113 5 MG CHEW, Chew, Disp: , Rfl:     sertraline (ZOLOFT) 25 mg tablet, Take 1 tablet (25 mg total) by mouth daily, Disp: 30 tablet, Rfl: 2    Allergies   Allergen Reactions    Pollen Extract        Social History     Socioeconomic History    Marital status: /Civil Union     Spouse name: Not on file    Number of children: Not on file    Years of education: Not on file    Highest education level: Not on file   Occupational History    Not on file   Social Needs    Financial resource strain: Not on file    Food insecurity:     Worry: Not on file     Inability: Not on file    Transportation needs:     Medical: Not on file     Non-medical: Not on file   Tobacco Use    Smoking status: Never Smoker    Smokeless tobacco: Never Used   Substance and Sexual Activity    Alcohol use: Not Currently    Drug use: Never    Sexual activity: Yes     Partners: Male     Birth control/protection: None   Lifestyle    Physical activity:     Days per week: Not on file     Minutes per session: Not on file    Stress: Not on file   Relationships    Social connections:     Talks on phone: Not on file     Gets together: Not on file     Attends Hindu service: Not on file     Active member of club or organization: Not on file     Attends meetings of clubs or organizations: Not on file     Relationship status: Not on file    Intimate partner violence:     Fear of current or ex partner: Not on file     Emotionally abused: Not on file     Physically abused: Not on file     Forced sexual activity: Not on file   Other Topics Concern    Not on file   Social History Narrative    Not on file       Family History   Problem Relation Age of Onset    Asthma Father     Bronchiolitis Father     Depression Father     Heart defect Sister     Supraventricular tachycardia Sister         cathecholaminergic polymorphic    Breast cancer Maternal Grandmother     Diabetes Maternal Grandmother     Prostate cancer Paternal Grandfather     Alcohol abuse Paternal Grandfather     Cancer Paternal Grandfather         lung and/or prostate    Thyroid disease Paternal Grandmother     Thyroid disease Paternal Aunt     Hypertension Family     Ovarian cancer Neg Hx     Uterine cancer Neg Hx        Review of Systems   Constitutional: Negative for chills, fatigue, fever and unexpected weight change  HENT: Negative for congestion, mouth sores and sore throat  Respiratory: Negative for cough, chest tightness, shortness of breath and wheezing  Cardiovascular: Negative for chest pain and palpitations  Gastrointestinal: Negative for abdominal distention, abdominal pain, constipation, diarrhea, nausea and vomiting  Endocrine: Negative for cold intolerance and heat intolerance  Genitourinary: Negative for dyspareunia, dysuria, genital sores, menstrual problem, pelvic pain, vaginal bleeding, vaginal discharge and vaginal pain  Musculoskeletal: Negative for arthralgias  Skin: Negative for color change and rash  Neurological: Negative for dizziness, light-headedness and headaches  Hematological: Negative for adenopathy  Blood pressure 100/80, pulse 68, weight 74 7 kg (164 lb 9 6 oz), last menstrual period 04/03/2019, SpO2 98 %, currently breastfeeding  and Body mass index is 28 25 kg/m²  Physical Exam   Constitutional: She is oriented to person, place, and time  She appears well-developed and well-nourished  HENT:   Head: Normocephalic and atraumatic  Eyes: Conjunctivae and EOM are normal    Neck: Normal range of motion  Neck supple  No tracheal deviation present  No thyromegaly present  Cardiovascular: Normal rate, regular rhythm and normal heart sounds  Pulmonary/Chest: Effort normal and breath sounds normal  No stridor  No respiratory distress  She has no wheezes  She has no rales  Abdominal: Soft  Bowel sounds are normal  She exhibits no distension and no mass  There is no tenderness  There is no rebound and no guarding  Musculoskeletal: Normal range of motion  She exhibits no edema or tenderness  Lymphadenopathy:     She has no cervical adenopathy  Neurological: She is alert and oriented to person, place, and time  Skin: Skin is warm  No rash noted  No erythema  Psychiatric: She has a normal mood and affect  Her behavior is normal  Judgment and thought content normal      Breasts: breasts appear normal, no suspicious masses, no skin or nipple changes or axillary nodes, lactating, no erythema or tenderness, nipples normal      vulva: normal external genitalia for age and no lesions, masses, epithelial changes, or exudate  vagina: color pink and rugae  well formed rugae  cervix: parous and no lesions   uterus: NSSC, AF, NT, mobile  adnexa: no masses or tenderness      A/P:  Pt is a 22 y o   with      Diagnoses and all orders for this visit:    Postpartum care and examination of lactating mother  -doing well    Post partum depression  -     sertraline (ZOLOFT) 25 mg tablet;  Take 1 tablet (25 mg total) by mouth daily    Contraceptive education  -pt desires Mirena, see HPI  F/U for IUD insertion

## 2020-04-23 ENCOUNTER — TELEMEDICINE (OUTPATIENT)
Dept: OBGYN CLINIC | Facility: CLINIC | Age: 25
End: 2020-04-23
Payer: COMMERCIAL

## 2020-04-23 VITALS — BODY MASS INDEX: 27.83 KG/M2 | WEIGHT: 163 LBS | HEIGHT: 64 IN

## 2020-04-23 PROCEDURE — 99213 OFFICE O/P EST LOW 20 MIN: CPT | Performed by: OBSTETRICS & GYNECOLOGY

## 2020-04-24 ENCOUNTER — TELEPHONE (OUTPATIENT)
Dept: OBGYN CLINIC | Facility: CLINIC | Age: 25
End: 2020-04-24

## 2020-05-06 ENCOUNTER — TELEPHONE (OUTPATIENT)
Dept: OBGYN CLINIC | Facility: CLINIC | Age: 25
End: 2020-05-06

## 2020-05-08 ENCOUNTER — TELEPHONE (OUTPATIENT)
Dept: OBGYN CLINIC | Facility: CLINIC | Age: 25
End: 2020-05-08

## 2020-05-12 ENCOUNTER — PROCEDURE VISIT (OUTPATIENT)
Dept: OBGYN CLINIC | Facility: CLINIC | Age: 25
End: 2020-05-12
Payer: COMMERCIAL

## 2020-05-12 VITALS
WEIGHT: 168 LBS | DIASTOLIC BLOOD PRESSURE: 80 MMHG | BODY MASS INDEX: 28.84 KG/M2 | HEART RATE: 62 BPM | TEMPERATURE: 98.5 F | SYSTOLIC BLOOD PRESSURE: 120 MMHG

## 2020-05-12 DIAGNOSIS — Z30.430 ENCOUNTER FOR IUD INSERTION: Primary | ICD-10-CM

## 2020-05-12 LAB — SL AMB POCT URINE HCG: NORMAL

## 2020-05-12 PROCEDURE — 81025 URINE PREGNANCY TEST: CPT | Performed by: OBSTETRICS & GYNECOLOGY

## 2020-05-12 PROCEDURE — 58300 INSERT INTRAUTERINE DEVICE: CPT | Performed by: OBSTETRICS & GYNECOLOGY

## 2020-05-19 ENCOUNTER — TELEMEDICINE (OUTPATIENT)
Dept: BEHAVIORAL/MENTAL HEALTH CLINIC | Facility: CLINIC | Age: 25
End: 2020-05-19
Payer: COMMERCIAL

## 2020-05-19 PROCEDURE — 90834 PSYTX W PT 45 MINUTES: CPT | Performed by: SOCIAL WORKER

## 2020-05-21 ENCOUNTER — TELEPHONE (OUTPATIENT)
Dept: OBGYN CLINIC | Facility: CLINIC | Age: 25
End: 2020-05-21

## 2020-06-04 ENCOUNTER — TELEMEDICINE (OUTPATIENT)
Dept: BEHAVIORAL/MENTAL HEALTH CLINIC | Facility: CLINIC | Age: 25
End: 2020-06-04
Payer: COMMERCIAL

## 2020-06-04 PROCEDURE — 90834 PSYTX W PT 45 MINUTES: CPT | Performed by: SOCIAL WORKER

## 2020-06-18 ENCOUNTER — TELEPHONE (OUTPATIENT)
Dept: BEHAVIORAL/MENTAL HEALTH CLINIC | Facility: CLINIC | Age: 25
End: 2020-06-18

## 2020-06-23 ENCOUNTER — TELEPHONE (OUTPATIENT)
Dept: OBGYN CLINIC | Facility: CLINIC | Age: 25
End: 2020-06-23

## 2020-06-24 ENCOUNTER — ANNUAL EXAM (OUTPATIENT)
Dept: OBGYN CLINIC | Facility: CLINIC | Age: 25
End: 2020-06-24
Payer: COMMERCIAL

## 2020-06-24 VITALS
HEIGHT: 64 IN | TEMPERATURE: 97.9 F | SYSTOLIC BLOOD PRESSURE: 114 MMHG | BODY MASS INDEX: 29.84 KG/M2 | DIASTOLIC BLOOD PRESSURE: 76 MMHG | WEIGHT: 174.8 LBS

## 2020-06-24 DIAGNOSIS — Z30.431 CONTRACEPTIVE, SURVEILLANCE, INTRAUTERINE DEVICE: ICD-10-CM

## 2020-06-24 DIAGNOSIS — Z12.4 PAP SMEAR FOR CERVICAL CANCER SCREENING: ICD-10-CM

## 2020-06-24 DIAGNOSIS — Z72.3 INADEQUATE EXERCISE: ICD-10-CM

## 2020-06-24 DIAGNOSIS — Z01.419 ENCOUNTER FOR ANNUAL ROUTINE GYNECOLOGICAL EXAMINATION: Primary | ICD-10-CM

## 2020-06-24 DIAGNOSIS — E58 DIETARY CALCIUM DEFICIENCY: ICD-10-CM

## 2020-06-24 DIAGNOSIS — Z23 NEED FOR HPV VACCINATION: ICD-10-CM

## 2020-06-24 DIAGNOSIS — Z20.2 POSSIBLE EXPOSURE TO STD: ICD-10-CM

## 2020-06-24 PROCEDURE — 90651 9VHPV VACCINE 2/3 DOSE IM: CPT | Performed by: OBSTETRICS & GYNECOLOGY

## 2020-06-24 PROCEDURE — 90471 IMMUNIZATION ADMIN: CPT | Performed by: OBSTETRICS & GYNECOLOGY

## 2020-06-24 PROCEDURE — S0612 ANNUAL GYNECOLOGICAL EXAMINA: HCPCS | Performed by: OBSTETRICS & GYNECOLOGY

## 2020-06-26 LAB
C TRACH RRNA SPEC QL NAA+PROBE: NOT DETECTED
CLINICAL INFO: NORMAL
CYTO CVX: NORMAL
CYTOLOGY CMNT CVX/VAG CYTO-IMP: NORMAL
DATE PREVIOUS BX: NORMAL
HPV E6+E7 MRNA CVX QL NAA+PROBE: NOT DETECTED
LMP START DATE: NORMAL
N GONORRHOEA RRNA SPEC QL NAA+PROBE: NOT DETECTED
SL AMB PREV. PAP:: NORMAL
SPECIMEN SOURCE CVX/VAG CYTO: NORMAL

## 2020-08-24 ENCOUNTER — CLINICAL SUPPORT (OUTPATIENT)
Dept: OBGYN CLINIC | Facility: CLINIC | Age: 25
End: 2020-08-24
Payer: COMMERCIAL

## 2020-08-24 VITALS — BODY MASS INDEX: 29.88 KG/M2 | TEMPERATURE: 98.1 F | WEIGHT: 175 LBS | HEIGHT: 64 IN

## 2020-08-24 DIAGNOSIS — Z23 NEED FOR HPV VACCINE: Primary | ICD-10-CM

## 2020-08-24 PROCEDURE — 90651 9VHPV VACCINE 2/3 DOSE IM: CPT | Performed by: OBSTETRICS & GYNECOLOGY

## 2020-08-24 PROCEDURE — 90471 IMMUNIZATION ADMIN: CPT | Performed by: OBSTETRICS & GYNECOLOGY

## 2020-09-01 ENCOUNTER — TELEMEDICINE (OUTPATIENT)
Dept: BEHAVIORAL/MENTAL HEALTH CLINIC | Facility: CLINIC | Age: 25
End: 2020-09-01
Payer: COMMERCIAL

## 2020-09-01 PROCEDURE — 90834 PSYTX W PT 45 MINUTES: CPT | Performed by: SOCIAL WORKER

## 2020-09-01 NOTE — PSYCH
Virtual Regular Visit      Assessment/Plan:    Problem List Items Addressed This Visit        Other    Post partum depression - Primary               Reason for visit is   Chief Complaint   Patient presents with    Virtual Regular Visit        Encounter provider Rafi Romero    Provider located at [address] [place] [id number]  [phone]      Recent Visits  No visits were found meeting these conditions  Showing recent visits within past 7 days and meeting all other requirements     Today's Visits  Date Type Provider Dept   09/01/20 Telemedicine Rafi Romero [facility]   Showing today's visits and meeting all other requirements     Future Appointments  No visits were found meeting these conditions  Showing future appointments within next 150 days and meeting all other requirements        The patient was identified by name and date of birth  Radu Finn was informed that this is a telemedicine visit and that the visit is being conducted through [employer]  My office door was closed  No one else was in the room  She acknowledged consent and understanding of privacy and security of the video platform  The patient has agreed to participate and understands they can discontinue the visit at any time  Patient is aware this is a billable service  Subjective  Radu Finn is a 26 y o  female was counseled for 45 minutes from 10:00 - 10:45am - telehealth due to covid      After intake with patient, found out , Cheli Zuluaga was having intense emotional affair with coworker  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  Kwesi Catalan, a friend from store/work came over in July but also [employer] came over - she didn't think anything about it at first as she dated some one else at store that Cheli Zuluaga was good friends with    Feels after discovering that, feels like a lot of issues makes sense but when issues arise, feel all attached to that  Many blows up since then and no talks have ended positively  Christal Escobedo feels that she is asking too much about affair - feels defensive and has to answer issues  She's resentful that he's defensive  - was crying constantly now feels tired and angry  More recently, Jorden's Dad got involved  When getting Karen one night, ate dinner, EVA, Estefanía helpful  Jorden's Dad got involved  He's very Sikhism/traditional and told her they should not get traditional counseling but argued that Christal Escobedo doesn't need to transfer out of department  MIL is heartbroken and katiuska telling him not to transfer - pt frustarted  Michael Natalia agreed that patient could go to aunt's with baby for 4 days and got a break  During break, he agreed that inappropriate conversations related to her sex life and sharing argument with patient with Coni Dias  Had a session with counselor through [facility] and has another one - screened situation and planning to see again - felt validated during session  Patient asked Christal Escobedo to make a job transfer and he is saying that they don't work together anymore as some one went to store management due to others reporting inappropriate relationship  Parenting feels much better - Rickie Mi doing well - sleeps very well - 8 month sold now - time away from Christal Escobedo also made her feel that she can handle Karen alone  Inconsistent with meds still even though helpful  Advised to take meds as prescribed  Explored anxiety management, communication, guidelines in marriage and continued ways to feel secure as a parent  HPI     Past Medical History:   Diagnosis Date    Echogenic focus of heart of fetus affecting antepartum care of mother 8/19/2019    Patient declined genetic testing    Rpt US at [address] Oral herpes     Post partum depression 1/23/2020    Varicella        Past Surgical History:   Procedure Laterality Date    KIDNEY SURGERY Bilateral bilateral ureter reimplantation, age 10       Current Outpatient Medications   Medication Sig Dispense Refill    levonorgestrel (MIRENA) 20 MCG/24HR IUD 1 each by Intrauterine route once      sertraline (ZOLOFT) 50 mg tablet Take 1 tablet (50 mg total) by mouth daily 90 tablet 3     No current facility-administered medications for this visit  Allergies   Allergen Reactions    Pollen Extract        Review of Systems    Video Exam    There were no vitals filed for this visit  Physical Exam  Oriented, engaged, sad and frustra  hui, full range affect, appropriate speech, denies suicidal/homicidal ideations/psychosis/desire to harm baby, fair to good insight/judgement    I spent 45 minutes directly with the patient during this visit      VIRTUAL VISIT DISCLAIMER    Jena Yamileth acknowledges that she has consented to an online visit or consultation  She understands that the online visit is based solely on information provided by her, and that, in the absence of a face-to-face physical evaluation by the physician, the diagnosis she receives is both limited and provisional in terms of accuracy and completeness  This is not intended to replace a full medical face-to-face evaluation by the physician  Jena Carson understands and accepts these terms

## 2020-09-25 ENCOUNTER — TELEMEDICINE (OUTPATIENT)
Dept: BEHAVIORAL/MENTAL HEALTH CLINIC | Facility: CLINIC | Age: 25
End: 2020-09-25
Payer: COMMERCIAL

## 2020-09-25 PROCEDURE — 90834 PSYTX W PT 45 MINUTES: CPT | Performed by: SOCIAL WORKER

## 2020-09-25 NOTE — PSYCH
Virtual Regular Visit      Assessment/Plan:    Problem List Items Addressed This Visit        Other    Post partum depression - Primary               Reason for visit is   Chief Complaint   Patient presents with    Virtual Regular Visit        Encounter provider Ruba Sullivan    Provider located at [address]      Recent Visits  No visits were found meeting these conditions  Showing recent visits within past 7 days and meeting all other requirements     Today's Visits  Date Type Provider Dept   09/25/20 Telemedicine Ruba Sullivan [facility]   Showing today's visits and meeting all other requirements     Future Appointments  No visits were found meeting these conditions  Showing future appointments within next 150 days and meeting all other requirements        The patient was identified by name and date of birth  Ken Lozano was informed that this is a telemedicine visit and that the visit is being conducted through [employer]  My office door was closed  No one else was in the room  She acknowledged consent and understanding of privacy and security of the video platform  The patient has agreed to participate and understands they can discontinue the visit at any time  Patient is aware this is a billable service  Subjective  Ken Lozano is a 26 y o  female was counseled for 45 minutes for 9:00 - 9:45am - telehealth due to covid      Emma Kim is turning 6 months old - crawling, has teeth, starting to stand  Feeling much better about things - consistent marriage counseling now - stopped going to parents for advice and able to tell katiuska that his opinion and advice was not helpful to them  Then saw 's son who shared that his parents get counseling  Her parents very supportive of them getting counseling    Explored emotional affair Tanya Adams was having - disconnection between the couple, patient's resentment, not nurturing relationship contributed  Explored views related to family dynamics - traditional roles in his [de-identified] and nontraditional in hers - mom breadwinner  Close couple they spend time with are going through negative separation and realized negative discussions contributed to patient's feelings towards each other as well  Recognizing need for time for self as well as time as a couple  Patient is not on social meeting/staying on phone all the time now - realized negative impact - is now working out, showering and making use of her time differently - reading  When working, taking full lunch break - eating then taking a walk  Marriage assessment tools helpful  Interviewing with new companies - call back for new company  Desire to make changes that contribute to healthier lifestyle have increased  Stopped Zoloft almost two months ago and feels no longer needs  Discussed postpartum improvements, benefits of therapy and routine  HPI     Past Medical History:   Diagnosis Date    Echogenic focus of heart of fetus affecting antepartum care of mother 8/19/2019    Patient declined genetic testing  Rpt  at [address] Oral herpes     Post partum depression 1/23/2020    Varicella        Past Surgical History:   Procedure Laterality Date    KIDNEY SURGERY Bilateral     bilateral ureter reimplantation, age 10       Current Outpatient Medications   Medication Sig Dispense Refill    levonorgestrel (MIRENA) 20 MCG/24HR IUD 1 each by Intrauterine route once      sertraline (ZOLOFT) 50 mg tablet Take 1 tablet (50 mg total) by mouth daily 90 tablet 3     No current facility-administered medications for this visit  Allergies   Allergen Reactions    Pollen Extract        Review of Systems    Video Exam    There were no vitals filed for this visit      Physical Exam Oriented, engaged, happy/calm, full range affect, appropriate speech, denies suicidal/homicidal ideations/psychosis/desire to harm baby, good insight/judgement  Jason Levi present, smiling, waking, present - positive interaction between patient and baby    I spent 45 minutes directly with the patient during this visit      VIRTUAL VISIT DISCLAIMER    Loli Yue acknowledges that she has consented to an online visit or consultation  She understands that the online visit is based solely on information provided by her, and that, in the absence of a face-to-face physical evaluation by the physician, the diagnosis she receives is both limited and provisional in terms of accuracy and completeness  This is not intended to replace a full medical face-to-face evaluation by the physician  Loli Turner understands and accepts these terms

## 2020-10-05 ENCOUNTER — TELEMEDICINE (OUTPATIENT)
Dept: BEHAVIORAL/MENTAL HEALTH CLINIC | Facility: CLINIC | Age: 25
End: 2020-10-05
Payer: COMMERCIAL

## 2020-10-05 PROCEDURE — 90834 PSYTX W PT 45 MINUTES: CPT | Performed by: SOCIAL WORKER

## 2020-10-08 ENCOUNTER — PROCEDURE VISIT (OUTPATIENT)
Dept: OBGYN CLINIC | Facility: CLINIC | Age: 25
End: 2020-10-08
Payer: COMMERCIAL

## 2020-10-08 VITALS
WEIGHT: 178 LBS | SYSTOLIC BLOOD PRESSURE: 110 MMHG | DIASTOLIC BLOOD PRESSURE: 80 MMHG | BODY MASS INDEX: 30.55 KG/M2 | TEMPERATURE: 97 F | HEART RATE: 73 BPM

## 2020-10-08 DIAGNOSIS — Z30.432 ENCOUNTER FOR IUD REMOVAL: Primary | ICD-10-CM

## 2020-10-08 PROCEDURE — 58301 REMOVE INTRAUTERINE DEVICE: CPT | Performed by: NURSE PRACTITIONER

## 2020-11-13 ENCOUNTER — TELEMEDICINE (OUTPATIENT)
Dept: BEHAVIORAL/MENTAL HEALTH CLINIC | Facility: CLINIC | Age: 25
End: 2020-11-13
Payer: COMMERCIAL

## 2020-11-13 PROCEDURE — 90834 PSYTX W PT 45 MINUTES: CPT | Performed by: SOCIAL WORKER

## 2021-02-05 ENCOUNTER — CLINICAL SUPPORT (OUTPATIENT)
Dept: OBGYN CLINIC | Facility: CLINIC | Age: 26
End: 2021-02-05

## 2021-02-05 DIAGNOSIS — N94.89 SUPPRESSION OF MENSES: Primary | ICD-10-CM

## 2021-02-05 NOTE — PROGRESS NOTES
Patient presents to office for walk in UPT, states LMP 12/31/20  Patient unable to leave urine sample in office, HCG quant script given to patient

## 2021-02-06 LAB — B-HCG SERPL-ACNC: 2147 MIU/ML

## 2021-03-28 ENCOUNTER — HOSPITAL ENCOUNTER (EMERGENCY)
Facility: HOSPITAL | Age: 26
Discharge: HOME | End: 2021-03-28
Attending: EMERGENCY MEDICINE | Admitting: EMERGENCY MEDICINE
Payer: COMMERCIAL

## 2021-03-28 ENCOUNTER — APPOINTMENT (EMERGENCY)
Dept: ULTRASOUND IMAGING | Facility: HOSPITAL | Age: 26
End: 2021-03-28
Payer: COMMERCIAL

## 2021-03-28 VITALS
OXYGEN SATURATION: 100 % | RESPIRATION RATE: 16 BRPM | DIASTOLIC BLOOD PRESSURE: 65 MMHG | TEMPERATURE: 98.3 F | SYSTOLIC BLOOD PRESSURE: 132 MMHG | HEART RATE: 65 BPM

## 2021-03-28 DIAGNOSIS — N93.8 DYSFUNCTIONAL UTERINE BLEEDING: Primary | ICD-10-CM

## 2021-03-28 LAB
ABO GROUP BLD: NORMAL
ANION GAP SERPL CALCULATED.3IONS-SCNC: 9 MMOL/L (ref 4–13)
APTT PPP: 27 SECONDS (ref 23–37)
B-HCG SERPL-ACNC: 53 MIU/ML
BACTERIA UR QL AUTO: NORMAL /HPF
BASOPHILS # BLD AUTO: 0.07 THOUSANDS/ΜL (ref 0–0.1)
BASOPHILS NFR BLD AUTO: 1 % (ref 0–1)
BILIRUB UR QL STRIP: NEGATIVE
BLD GP AB SCN SERPL QL: NEGATIVE
BUN SERPL-MCNC: 12 MG/DL (ref 5–25)
CALCIUM SERPL-MCNC: 9.3 MG/DL (ref 8.3–10.1)
CHLORIDE SERPL-SCNC: 104 MMOL/L (ref 100–108)
CLARITY UR: CLEAR
CO2 SERPL-SCNC: 25 MMOL/L (ref 21–32)
COLOR UR: YELLOW
CREAT SERPL-MCNC: 0.87 MG/DL (ref 0.6–1.3)
EOSINOPHIL # BLD AUTO: 0.07 THOUSAND/ΜL (ref 0–0.61)
EOSINOPHIL NFR BLD AUTO: 1 % (ref 0–6)
ERYTHROCYTE [DISTWIDTH] IN BLOOD BY AUTOMATED COUNT: 12 % (ref 11.6–15.1)
EXT PREG TEST URINE: POSITIVE
EXT. CONTROL ED NAV: ABNORMAL
GFR SERPL CREATININE-BSD FRML MDRD: 94 ML/MIN/1.73SQ M
GLUCOSE SERPL-MCNC: 97 MG/DL (ref 65–140)
GLUCOSE UR STRIP-MCNC: NEGATIVE MG/DL
HCT VFR BLD AUTO: 40.4 % (ref 34.8–46.1)
HGB BLD-MCNC: 14 G/DL (ref 11.5–15.4)
HGB UR QL STRIP.AUTO: ABNORMAL
IMM GRANULOCYTES # BLD AUTO: 0.06 THOUSAND/UL (ref 0–0.2)
IMM GRANULOCYTES NFR BLD AUTO: 1 % (ref 0–2)
INR PPP: 1.01 (ref 0.84–1.19)
KETONES UR STRIP-MCNC: NEGATIVE MG/DL
LEUKOCYTE ESTERASE UR QL STRIP: NEGATIVE
LYMPHOCYTES # BLD AUTO: 2.58 THOUSANDS/ΜL (ref 0.6–4.47)
LYMPHOCYTES NFR BLD AUTO: 37 % (ref 14–44)
MCH RBC QN AUTO: 29 PG (ref 26.8–34.3)
MCHC RBC AUTO-ENTMCNC: 34.7 G/DL (ref 31.4–37.4)
MCV RBC AUTO: 84 FL (ref 82–98)
MONOCYTES # BLD AUTO: 0.41 THOUSAND/ΜL (ref 0.17–1.22)
MONOCYTES NFR BLD AUTO: 6 % (ref 4–12)
NEUTROPHILS # BLD AUTO: 3.78 THOUSANDS/ΜL (ref 1.85–7.62)
NEUTS SEG NFR BLD AUTO: 54 % (ref 43–75)
NITRITE UR QL STRIP: NEGATIVE
NON-SQ EPI CELLS URNS QL MICRO: NORMAL /HPF
NRBC BLD AUTO-RTO: 0 /100 WBCS
PH UR STRIP.AUTO: 5.5 [PH]
PLATELET # BLD AUTO: 266 THOUSANDS/UL (ref 149–390)
PMV BLD AUTO: 10.4 FL (ref 8.9–12.7)
POTASSIUM SERPL-SCNC: 4 MMOL/L (ref 3.5–5.3)
PROT UR STRIP-MCNC: NEGATIVE MG/DL
PROTHROMBIN TIME: 13.4 SECONDS (ref 11.6–14.5)
RBC # BLD AUTO: 4.83 MILLION/UL (ref 3.81–5.12)
RBC #/AREA URNS AUTO: NORMAL /HPF
RH BLD: POSITIVE
SODIUM SERPL-SCNC: 138 MMOL/L (ref 136–145)
SP GR UR STRIP.AUTO: 1.01 (ref 1–1.03)
SPECIMEN EXPIRATION DATE: NORMAL
UROBILINOGEN UR QL STRIP.AUTO: 0.2 E.U./DL
WBC # BLD AUTO: 6.97 THOUSAND/UL (ref 4.31–10.16)
WBC #/AREA URNS AUTO: NORMAL /HPF

## 2021-03-28 PROCEDURE — 85025 COMPLETE CBC W/AUTO DIFF WBC: CPT | Performed by: EMERGENCY MEDICINE

## 2021-03-28 PROCEDURE — 86901 BLOOD TYPING SEROLOGIC RH(D): CPT | Performed by: EMERGENCY MEDICINE

## 2021-03-28 PROCEDURE — 84702 CHORIONIC GONADOTROPIN TEST: CPT | Performed by: EMERGENCY MEDICINE

## 2021-03-28 PROCEDURE — 81001 URINALYSIS AUTO W/SCOPE: CPT | Performed by: EMERGENCY MEDICINE

## 2021-03-28 PROCEDURE — 81025 URINE PREGNANCY TEST: CPT | Performed by: EMERGENCY MEDICINE

## 2021-03-28 PROCEDURE — 85610 PROTHROMBIN TIME: CPT | Performed by: EMERGENCY MEDICINE

## 2021-03-28 PROCEDURE — 86900 BLOOD TYPING SEROLOGIC ABO: CPT | Performed by: EMERGENCY MEDICINE

## 2021-03-28 PROCEDURE — 76830 TRANSVAGINAL US NON-OB: CPT

## 2021-03-28 PROCEDURE — 86850 RBC ANTIBODY SCREEN: CPT | Performed by: EMERGENCY MEDICINE

## 2021-03-28 PROCEDURE — 99284 EMERGENCY DEPT VISIT MOD MDM: CPT

## 2021-03-28 PROCEDURE — 76856 US EXAM PELVIC COMPLETE: CPT

## 2021-03-28 PROCEDURE — 36415 COLL VENOUS BLD VENIPUNCTURE: CPT | Performed by: EMERGENCY MEDICINE

## 2021-03-28 PROCEDURE — 80048 BASIC METABOLIC PNL TOTAL CA: CPT | Performed by: EMERGENCY MEDICINE

## 2021-03-28 PROCEDURE — 85730 THROMBOPLASTIN TIME PARTIAL: CPT | Performed by: EMERGENCY MEDICINE

## 2021-03-28 PROCEDURE — 99282 EMERGENCY DEPT VISIT SF MDM: CPT | Performed by: EMERGENCY MEDICINE

## 2021-03-28 NOTE — ED PROVIDER NOTES
History  Chief Complaint   Patient presents with    Vaginal Bleeding     pt reports [facility] , was given birth control, took for 2 weeks, then stopped due to side effects, started bleeding again, now bleeding x 19 days, states now having symptoms of anemia     60-year-old female  comes in for evaluation of vaginal bleeding  Patient states that on  [facility]  Patient states she initially had some bleeding that stopped  She then started birth control pill  after 2 weeks she did not like the birth control and its side effects so she stopped taking it  Patient states that she stop taking that pill she has been bleeding daily  She called her OBGYN who told her to come into the emergency department to be evaluated  History provided by:  Patient   used: No    Vaginal Bleeding  Quality:  Clots and typical of menses  Severity:  Moderate  Onset quality:  Gradual  Duration:  2 weeks  Timing:  Constant  Progression:  Unchanged  Chronicity:  New  Menstrual history:  Regular  Possible pregnancy: no    Context: spontaneously    Ineffective treatments:  None tried  Associated symptoms: fatigue    Associated symptoms: no abdominal pain, no back pain, no dysuria, no fever and no vaginal discharge    Risk factors: terminated pregnancy    Risk factors: no bleeding disorder        Prior to Admission Medications   Prescriptions Last Dose Informant Patient Reported? Taking?   levonorgestrel (MIRENA) 20 MCG/24HR IUD   Yes No   Si each by Intrauterine route once   sertraline (ZOLOFT) 50 mg tablet   No No   Sig: Take 1 tablet (50 mg total) by mouth daily      Facility-Administered Medications: None       Past Medical History:   Diagnosis Date    Echogenic focus of heart of fetus affecting antepartum care of mother 2019    Patient declined genetic testing    Rpt US at [address] Oral herpes     Post partum depression 1/23/2020    Varicella        Past Surgical History:   Procedure Laterality Date    KIDNEY SURGERY Bilateral     bilateral ureter reimplantation, age 11    REMOVAL OF INTRAUTERINE DEVICE (IUD)  10/08/2020    Mirena IUD removed       Family History   Problem Relation Age of Onset    Asthma Father     Bronchiolitis Father     Depression Father     Heart defect Sister     Supraventricular tachycardia Sister         cathecholaminergic polymorphic    Breast cancer Maternal Grandmother         <50    Diabetes Maternal Grandmother     Prostate cancer Paternal Grandfather     Alcohol abuse Paternal Grandfather     Cancer Paternal Grandfather         lung and/or prostate    Thyroid disease Paternal Grandmother     Thyroid disease Paternal Aunt     Hypertension Family     Ovarian cancer Neg Hx     Uterine cancer Neg Hx      I have reviewed and agree with the history as documented  E-Cigarette/Vaping    E-Cigarette Use Never User      E-Cigarette/Vaping Substances     Social History     Tobacco Use    Smoking status: Never Smoker    Smokeless tobacco: Never Used   Substance Use Topics    Alcohol use: Yes     Alcohol/week: 1 0 standard drinks     Types: 1 Glasses of wine per week     Frequency: 2-4 times a month     Drinks per session: 1 or 2    Drug use: Never       Review of Systems   Constitutional: Positive for fatigue  Negative for fever  HENT: Negative for congestion and ear pain  Eyes: Negative for discharge and redness  Respiratory: Negative for apnea, cough, shortness of breath and wheezing  Cardiovascular: Negative for chest pain  Gastrointestinal: Negative for abdominal pain and diarrhea  Endocrine: Negative for cold intolerance and polydipsia  Genitourinary: Positive for vaginal bleeding  Negative for difficulty urinating, dysuria, hematuria and vaginal discharge  Musculoskeletal: Negative for arthralgias and back pain  Skin: Negative for color change and rash  Allergic/Immunologic: Negative for environmental allergies and immunocompromised state  Neurological: Negative for numbness and headaches  Hematological: Negative for adenopathy  Does not bruise/bleed easily  Psychiatric/Behavioral: Negative for agitation and behavioral problems  Physical Exam  Physical Exam  Vitals signs and nursing note reviewed  Constitutional:       Appearance: Normal appearance  She is well-developed  She is not toxic-appearing  HENT:      Head: Normocephalic and atraumatic  Right Ear: Tympanic membrane and external ear normal       Left Ear: Tympanic membrane and external ear normal       Nose: Nose normal  No nasal deformity or rhinorrhea  Mouth/Throat:      Dentition: Normal dentition  Pharynx: Uvula midline  Eyes:      General: Lids are normal          Right eye: No discharge  Left eye: No discharge  Conjunctiva/sclera: Conjunctivae normal       Pupils: Pupils are equal, round, and reactive to light  Neck:      Musculoskeletal: Normal range of motion and neck supple  Vascular: No carotid bruit or JVD  Trachea: Trachea normal    Cardiovascular:      Rate and Rhythm: Normal rate and regular rhythm  No extrasystoles are present  Chest Wall: PMI is not displaced  Pulses: Normal pulses  Pulmonary:      Effort: Pulmonary effort is normal  No accessory muscle usage or respiratory distress  Breath sounds: Normal breath sounds  No wheezing, rhonchi or rales  Abdominal:      General: Bowel sounds are normal       Palpations: Abdomen is soft  Abdomen is not rigid  There is no mass  Tenderness: There is no abdominal tenderness  There is no guarding or rebound  Musculoskeletal:      Right shoulder: She exhibits normal range of motion, no bony tenderness, no swelling and no deformity  Cervical back: Normal  She exhibits normal range of motion, no tenderness, no bony tenderness and no deformity     Lymphadenopathy: Cervical: No cervical adenopathy  Skin:     General: Skin is warm and dry  Findings: No rash  Neurological:      Mental Status: She is alert and oriented to person, place, and time  GCS: GCS eye subscore is 4  GCS verbal subscore is 5  GCS motor subscore is 6  Cranial Nerves: No cranial nerve deficit  Sensory: No sensory deficit  Deep Tendon Reflexes: Reflexes are normal and symmetric     Psychiatric:         Speech: Speech normal          Behavior: Behavior normal          Vital Signs  ED Triage Vitals   Temperature Pulse Respirations Blood Pressure SpO2   03/28/21 1532 03/28/21 1530 03/28/21 1530 03/28/21 1530 03/28/21 1530   98 3 °F (36 8 °C) 69 18 148/82 100 %      Temp Source Heart Rate Source Patient Position - Orthostatic VS BP Location FiO2 (%)   03/28/21 1532 03/28/21 1530 -- -- --   Oral Monitor         Pain Score       --                  Vitals:    03/28/21 1530   BP: 148/82   Pulse: 69         Visual Acuity      ED Medications  Medications - No data to display    Diagnostic Studies  Results Reviewed     Procedure Component Value Units Date/Time    Basic metabolic panel [[id number]] Collected: 03/28/21 1557    Lab Status: Final result Specimen: Blood from Arm, Left Updated: 03/28/21 1628     Sodium 138 mmol/L      Potassium 4 0 mmol/L      Chloride 104 mmol/L      CO2 25 mmol/L      ANION GAP 9 mmol/L      BUN 12 mg/dL      Creatinine 0 87 mg/dL      Glucose 97 mg/dL      Calcium 9 3 mg/dL      eGFR 94 ml/min/1 73sq m     Narrative:      [place] guidelines for Chronic Kidney Disease (CKD):     Stage 1 with normal or high GFR (GFR > 90 mL/min/1 73 square meters)    Stage 2 Mild CKD (GFR = 60-89 mL/min/1 73 square meters)    Stage 3A Moderate CKD (GFR = 45-59 mL/min/1 73 square meters)    Stage 3B Moderate CKD (GFR = 30-44 mL/min/1 73 square meters)    Stage 4 Severe CKD (GFR = 15-29 mL/min/1 73 square meters)    Stage 5 End Stage CKD (GFR <15 mL/min/1 73 square meters)  Note: GFR calculation is accurate only with a steady state creatinine    Quantitative hCG [[id number]]  (Abnormal) Collected: 03/28/21 1557    Lab Status: Final result Specimen: Blood from Arm, Left Updated: 03/28/21 1628     HCG, Quant 53 mIU/mL     Narrative:       Expected Ranges:     Approximate               Approximate HCG  Gestation age          Concentration ( mIU/mL)  _____________          ______________________   [facility]                      HCG values  0 2-1                       5-50  1-2                           2-3                         100-5000  3-4                         500-80104  4-5                         1000-32274  5-6                         [id number]  6-8                         [id number]  8-12                        [id number]      Urine Microscopic [[id number]]  (Normal) Collected: 03/28/21 1557    Lab Status: Final result Specimen: Urine, Clean Catch Updated: 03/28/21 1623     RBC, UA 0-1 /hpf      WBC, UA 0-1 /hpf      Epithelial Cells Occasional /hpf      Bacteria, UA Occasional /hpf     Protime-INR [[id number]]  (Normal) Collected: 03/28/21 1557    Lab Status: Final result Specimen: Blood from Arm, Left Updated: 03/28/21 1617     Protime 13 4 seconds      INR 1 01    APTT [[id number]]  (Normal) Collected: 03/28/21 1557    Lab Status: Final result Specimen: Blood from Arm, Left Updated: 03/28/21 1617     PTT 27 seconds     UA w Reflex to Microscopic w Reflex to Culture [[id number]]  (Abnormal) Collected: 03/28/21 1557    Lab Status: Final result Specimen: Urine, Clean Catch Updated: 03/28/21 1607     Color, UA Yellow     Clarity, UA Clear     Specific Gravity, UA 1 010     pH, UA 5 5     Leukocytes, UA Negative     Nitrite, UA Negative     Protein, UA Negative mg/dl      Glucose, UA Negative mg/dl      Ketones, UA Negative mg/dl      Urobilinogen, UA 0 2 E U /dl      Bilirubin, UA Negative     Blood, UA Large    CBC and differential [[id number]] Collected: 21    Lab Status: Final result Specimen: Blood from Arm, Left Updated: 21 1606     WBC 6 97 Thousand/uL      RBC 4 83 Million/uL      Hemoglobin 14 0 g/dL      Hematocrit 40 4 %      MCV 84 fL      MCH 29 0 pg      MCHC 34 7 g/dL      RDW 12 0 %      MPV 10 4 fL      Platelets 274 Thousands/uL      nRBC 0 /100 WBCs      Neutrophils Relative 54 %      Immat GRANS % 1 %      Lymphocytes Relative 37 %      Monocytes Relative 6 %      Eosinophils Relative 1 %      Basophils Relative 1 %      Neutrophils Absolute 3 78 Thousands/µL      Immature Grans Absolute 0 06 Thousand/uL      Lymphocytes Absolute 2 58 Thousands/µL      Monocytes Absolute 0 41 Thousand/µL      Eosinophils Absolute 0 07 Thousand/µL      Basophils Absolute 0 07 Thousands/µL     POCT pregnancy, urine [[id number]]  (Abnormal) Resulted: 21    Lab Status: Final result Updated: 21     EXT PREG TEST UR (Ref: Negative) positive     Control valid                 US pelvis complete w transvaginal   Final Result by Gris Hazel MD ([id number])       Normal                       Workstation performed: [id number]                    Procedures  Procedures         ED Course                                           MDM  Number of Diagnoses or Management Options  Dysfunctional uterine bleeding: new and requires workup     Amount and/or Complexity of Data Reviewed  Clinical lab tests: ordered and reviewed  Tests in the radiology section of CPT®: ordered and reviewed  Tests in the medicine section of CPT®: ordered and reviewed    Risk of Complications, Morbidity, and/or Mortality  General comments: Discussed results with patient she is not anemic her ultrasound does not have any retained products    Is normal   Her beta hCG is still positive at 48 most likely she is having abnormal bleeding between having a medical  and then stopping birth control pills midcycle her body is trying to readjust   She should follow up with her and OBGYN for further treatment  Patient Progress  Patient progress: stable      Disposition  Final diagnoses:   Dysfunctional uterine bleeding     Time reflects when diagnosis was documented in both MDM as applicable and the Disposition within this note     Time User Action Codes Description Comment    3/28/2021  6:03 PM Kelin Gina Add [N93 8] Dysfunctional uterine bleeding       ED Disposition     ED Disposition Condition Date/Time Comment    Discharge Stable Sun Mar 28, 2021  6:02 PM Coni Grumbling discharge to home/self care  Follow-up Information     Follow up With Specialties Details Why Contact Info    Radames Delgado MD Obstetrics and Gynecology, Obstetrics, Gynecology Schedule an appointment as soon as possible for a visit    [address]   [phone]            Patient's Medications   Discharge Prescriptions    No medications on file     No discharge procedures on file      PDMP Review     None          ED Provider  Electronically Signed by           Jj Flores DO  03/28/21 1096

## 2021-09-29 ENCOUNTER — NURSE TRIAGE (OUTPATIENT)
Dept: OTHER | Facility: OTHER | Age: 26
End: 2021-09-29

## 2021-09-29 NOTE — TELEPHONE ENCOUNTER
Patient called in for COVID swabbing but needs test results sooner, was interested in a rapid test  Declined swab at this time

## 2022-01-03 ENCOUNTER — SOCIAL WORK (OUTPATIENT)
Dept: BEHAVIORAL/MENTAL HEALTH CLINIC | Facility: CLINIC | Age: 27
End: 2022-01-03
Payer: COMMERCIAL

## 2022-01-03 DIAGNOSIS — F39 MOOD DISORDER (HCC): Primary | ICD-10-CM

## 2022-01-03 PROCEDURE — 90834 PSYTX W PT 45 MINUTES: CPT | Performed by: SOCIAL WORKER

## 2022-01-03 NOTE — PSYCH
Virtual Regular Visit    Verification of patient location:    Patient is located in the following state in which I hold an active license [place]      Assessment/Plan:    Problem List Items Addressed This Visit     None      Visit Diagnoses     Mood disorder ([address] )    -  Primary          Goals addressed in session: Goal 1 : Obtain background information         Reason for visit is   Chief Complaint   Patient presents with    Virtual Regular Visit        Encounter provider Lukas Barry LCSW    Provider located at [address] [facility] [address] [phone]      Recent Visits  No visits were found meeting these conditions  Showing recent visits within past 7 days and meeting all other requirements  Future Appointments  No visits were found meeting these conditions  Showing future appointments within next 150 days and meeting all other requirements       The patient was identified by name and date of birth  Daniel Tong was informed that this is a telemedicine visit and that the visit is being conducted through[employer] and patient was informed that this is a secure, HIPAA-compliant platform  She agrees to proceed     My office door was closed  No one else was in the room  She acknowledged consent and understanding of privacy and security of the video platform  The patient has agreed to participate and understands they can discontinue the visit at any time  Patient is aware this is a billable service  Subjective  Daniel Tong is a 23 y o  female who seeks to resume therapy  Pt last seen by Chevy Swanson LCSW in November 2020  Met with pt from [address] [place] [address]  Pt explained that she stopped therapy a year ago after feeling as though she was getting better  Pt also stopped taking her Zoloft   She explained that she  from her  at that time and was feeling as though this was the source of her issues  Pt has recognized recently that she needs more assistance  Pt explained her symptoms going from feeling excitable and energetic to feeling down and depressed  She has had to force herself to get out of her house when feeling down  Pt described significant mood swings that have been ongoing for at least a year  Pt's sleep fluctuates depending on her mood as does her appetite  She finds that she has difficulty doing her job at times as well  Pt works for [employer] 3 days/week remote and 2 days in the office  Pt also has a daughter who turns 3yo on 1/5/22  Pt and her ex- reside very close to each other and co-parent effectively  They share custody 50/50  Pt has positive supports  She enjoys exercise, walking, reading and cooking  Explained to pt that she is explaining symptoms of Bipolar disorder and encouraged pt to seek medication management  Pt is in agreement  Therapist will refer pt to [address] [place] [address]  Pt will continue individual therapy on a bi-weekly basis  HPI     Past Medical History:   Diagnosis Date    Echogenic focus of heart of fetus affecting antepartum care of mother 8/19/2019    Patient declined genetic testing  Rpt US at [address] Oral herpes     Post partum depression 1/23/2020    Varicella        Past Surgical History:   Procedure Laterality Date    KIDNEY SURGERY Bilateral     bilateral ureter reimplantation, age 11    REMOVAL OF INTRAUTERINE DEVICE (IUD)  10/08/2020    Mirena IUD removed       Current Outpatient Medications   Medication Sig Dispense Refill    levonorgestrel (MIRENA) 20 MCG/24HR IUD 1 each by Intrauterine route once      sertraline (ZOLOFT) 50 mg tablet Take 1 tablet (50 mg total) by mouth daily 90 tablet 3     No current facility-administered medications for this visit  Allergies   Allergen Reactions    Pollen Extract        Review of Systems: Pt was pleasant, cooperative and engaged      Video Exam    There were no vitals filed for this visit     Physical Exam     I spent 45 minutes directly with the patient during this visit    VIRTUAL VISIT DISCLAIMER    Francesmaxine Dalton verbally agrees to participate in [employer]  Pt is aware that [employer] could be limited without vital signs or the ability to perform a full hands-on physical Debra Tan understands she or the provider may request at any time to terminate the video visit and request the patient to seek care or treatment in person

## 2022-01-04 ENCOUNTER — OFFICE VISIT (OUTPATIENT)
Dept: OBGYN CLINIC | Facility: CLINIC | Age: 27
End: 2022-01-04
Payer: COMMERCIAL

## 2022-01-04 VITALS
SYSTOLIC BLOOD PRESSURE: 120 MMHG | HEIGHT: 64 IN | BODY MASS INDEX: 29.19 KG/M2 | DIASTOLIC BLOOD PRESSURE: 74 MMHG | WEIGHT: 171 LBS

## 2022-01-04 DIAGNOSIS — Z01.419 ENCOUNTER FOR GYNECOLOGICAL EXAMINATION WITHOUT ABNORMAL FINDING: Primary | ICD-10-CM

## 2022-01-04 PROCEDURE — S0612 ANNUAL GYNECOLOGICAL EXAMINA: HCPCS | Performed by: NURSE PRACTITIONER

## 2022-01-05 PROBLEM — F39 MOOD DISORDER (HCC): Status: ACTIVE | Noted: 2022-01-05

## 2022-01-10 ENCOUNTER — TELEPHONE (OUTPATIENT)
Dept: PSYCHIATRY | Facility: CLINIC | Age: 27
End: 2022-01-10

## 2022-01-10 NOTE — TELEPHONE ENCOUNTER
----- Message from Viri Tinoco sent at 1/10/2022 10:19 AM EST -----  Regarding: waiting list  Dot Sanders would like to know if Marium Chanell can be put on the therapy waiting list  She is trying to put a referral in but she is having a problem  She is calling IT and getting it fixed

## 2022-07-18 ENCOUNTER — TELEPHONE (OUTPATIENT)
Dept: PSYCHIATRY | Facility: CLINIC | Age: 27
End: 2022-07-18

## 2022-07-18 NOTE — TELEPHONE ENCOUNTER
Was calling pt in regards to therapy wait list and talk therapy  pt picked up the phone and stated she could not talk right now and hung up

## 2022-08-10 ENCOUNTER — TELEPHONE (OUTPATIENT)
Dept: PSYCHIATRY | Facility: CLINIC | Age: 27
End: 2022-08-10

## 2022-09-06 ENCOUNTER — OFFICE VISIT (OUTPATIENT)
Dept: URGENT CARE | Age: 27
End: 2022-09-06
Payer: COMMERCIAL

## 2022-09-06 VITALS
HEART RATE: 84 BPM | OXYGEN SATURATION: 98 % | DIASTOLIC BLOOD PRESSURE: 80 MMHG | TEMPERATURE: 98.3 F | RESPIRATION RATE: 18 BRPM | SYSTOLIC BLOOD PRESSURE: 122 MMHG

## 2022-09-06 DIAGNOSIS — J06.9 VIRAL UPPER RESPIRATORY INFECTION: Primary | ICD-10-CM

## 2022-09-06 DIAGNOSIS — J06.9 VIRAL UPPER RESPIRATORY INFECTION: ICD-10-CM

## 2022-09-06 PROCEDURE — S9083 URGENT CARE CENTER GLOBAL: HCPCS | Performed by: NURSE PRACTITIONER

## 2022-09-06 PROCEDURE — G0382 LEV 3 HOSP TYPE B ED VISIT: HCPCS | Performed by: NURSE PRACTITIONER

## 2022-09-06 RX ORDER — PREDNISONE 50 MG/1
50 TABLET ORAL DAILY
Qty: 5 TABLET | Refills: 0 | Status: SHIPPED | OUTPATIENT
Start: 2022-09-06 | End: 2022-09-06 | Stop reason: SDUPTHER

## 2022-09-06 RX ORDER — BENZONATATE 200 MG/1
200 CAPSULE ORAL 3 TIMES DAILY PRN
Qty: 20 CAPSULE | Refills: 0 | Status: SHIPPED | OUTPATIENT
Start: 2022-09-06 | End: 2022-10-17 | Stop reason: ALTCHOICE

## 2022-09-06 RX ORDER — PREDNISONE 50 MG/1
50 TABLET ORAL DAILY
Qty: 5 TABLET | Refills: 0 | Status: SHIPPED | OUTPATIENT
Start: 2022-09-06 | End: 2022-09-11

## 2022-09-06 RX ORDER — BENZONATATE 200 MG/1
200 CAPSULE ORAL 3 TIMES DAILY PRN
Qty: 20 CAPSULE | Refills: 0 | Status: SHIPPED | OUTPATIENT
Start: 2022-09-06 | End: 2022-09-06 | Stop reason: SDUPTHER

## 2022-09-06 NOTE — PROGRESS NOTES
[facility]        NAME: Kyler Daniels is a 33 y o  female  : 1995    MRN: [id number]  DATE: 2022  TIME: 2:00 PM    Assessment and Plan   Viral upper respiratory infection [J06 9]  1  Viral upper respiratory infection  predniSONE 50 mg tablet    benzonatate (TESSALON) 200 MG capsule         Patient Instructions     Take meds as directed  Follow up with PCP in 3-5 days  Proceed to  ER if symptoms worsen  Chief Complaint     Chief Complaint   Patient presents with    Cough    Sore Throat     Patient been sick for about 1 week with her s/s         History of Present Illness       HPI   Reports cough and sore throat x 1 week  Wheezing, intermittent, x 3-4 days  Hx of asthma  Last flare up over 10 years ago  Home covid test x 2, were negative  Review of Systems   Review of Systems   Constitutional: Positive for chills  Negative for fever  HENT: Positive for postnasal drip, rhinorrhea and sore throat  Negative for ear pain  Respiratory: Positive for cough, shortness of breath and wheezing  Negative for chest tightness  Cardiovascular: Negative for chest pain  Gastrointestinal: Negative for diarrhea and vomiting  Musculoskeletal: Negative for joint swelling and myalgias  Skin: Negative for rash  Neurological: Positive for headaches           Current Medications       Current Outpatient Medications:     benzonatate (TESSALON) 200 MG capsule, Take 1 capsule (200 mg total) by mouth 3 (three) times a day as needed for cough, Disp: 20 capsule, Rfl: 0    predniSONE 50 mg tablet, Take 1 tablet (50 mg total) by mouth daily for 5 days, Disp: 5 tablet, Rfl: 0    Current Allergies     Allergies as of 2022 - Reviewed 2022   Allergen Reaction Noted    Pollen extract  2016            The following portions of the patient's history were reviewed and updated as appropriate: allergies, current medications, past family history, past medical history, past social history, past surgical history and problem list      Past Medical History:   Diagnosis Date    Echogenic focus of heart of fetus affecting antepartum care of mother 8/19/2019    Patient declined genetic testing  Rpt US at [address] Oral herpes     Post partum depression 1/23/2020    Varicella        Past Surgical History:   Procedure Laterality Date    KIDNEY SURGERY Bilateral     bilateral ureter reimplantation, age 11    REMOVAL OF INTRAUTERINE DEVICE (IUD)  10/08/2020    Mirena IUD removed       Family History   Problem Relation Age of Onset    Asthma Father     Bronchiolitis Father     Depression Father     Heart defect Sister     Supraventricular tachycardia Sister         cathecholaminergic polymorphic    Breast cancer Maternal Grandmother         <50    Diabetes Maternal Grandmother     Thyroid disease Maternal Grandmother         Not Shweta, this is my maternal grandmother on my dads side    Prostate cancer Paternal Grandfather     Alcohol abuse Paternal Grandfather     Cancer Paternal Grandfather         Prostate/lung    Thyroid disease Paternal Grandmother     Thyroid disease Paternal Aunt     Hypertension Family     Ovarian cancer Neg Hx     Uterine cancer Neg Hx     Colon cancer Neg Hx          Medications have been verified  Objective   /80 (BP Location: Right arm, Patient Position: Sitting, Cuff Size: Standard)   Pulse 84   Temp 98 3 °F (36 8 °C) (Temporal)   Resp 18   LMP 08/24/2022   SpO2 98%   Patient's last menstrual period was 08/24/2022  Physical Exam     Physical Exam  Constitutional:       Appearance: She is not ill-appearing or diaphoretic  HENT:      Right Ear: Tympanic membrane normal       Left Ear: Tympanic membrane normal       Nose: Rhinorrhea present  Mouth/Throat:      Mouth: Mucous membranes are moist       Pharynx: No pharyngeal swelling or posterior oropharyngeal erythema        Tonsils: No tonsillar exudate  0 on the right  0 on the left  Comments: Post nasal drip  Cardiovascular:      Rate and Rhythm: Regular rhythm  Pulmonary:      Effort: Pulmonary effort is normal       Breath sounds: Normal breath sounds  No wheezing

## 2022-10-17 ENCOUNTER — OFFICE VISIT (OUTPATIENT)
Dept: OBGYN CLINIC | Facility: CLINIC | Age: 27
End: 2022-10-17

## 2022-10-17 VITALS
BODY MASS INDEX: 29.88 KG/M2 | HEART RATE: 75 BPM | HEIGHT: 64 IN | DIASTOLIC BLOOD PRESSURE: 85 MMHG | WEIGHT: 175 LBS | SYSTOLIC BLOOD PRESSURE: 127 MMHG

## 2022-10-17 DIAGNOSIS — Z20.2 POSSIBLE EXPOSURE TO STD: ICD-10-CM

## 2022-10-17 DIAGNOSIS — Z30.09 ENCOUNTER FOR COUNSELING REGARDING CONTRACEPTION: Primary | ICD-10-CM

## 2022-10-17 LAB — SL AMB POCT URINE HCG: NEGATIVE

## 2022-10-17 PROCEDURE — 99213 OFFICE O/P EST LOW 20 MIN: CPT | Performed by: NURSE PRACTITIONER

## 2022-10-17 PROCEDURE — 87491 CHLMYD TRACH DNA AMP PROBE: CPT | Performed by: NURSE PRACTITIONER

## 2022-10-17 PROCEDURE — 81025 URINE PREGNANCY TEST: CPT | Performed by: NURSE PRACTITIONER

## 2022-10-17 PROCEDURE — 87591 N.GONORRHOEAE DNA AMP PROB: CPT | Performed by: NURSE PRACTITIONER

## 2022-10-17 NOTE — PROGRESS NOTES
Assessment/Plan:    No problem-specific Assessment & Plan notes found for this encounter  Annual due 2023     Possible exposure to STD- culture for gC and cT   Diagnoses and all orders for this visit:    Encounter for counseling regarding contraception  RTO in 1 week for IUD Paragard insertion/ UPT  Discussed paragard IUD with pt  Reviewed risks, risk of perforation 1000, pain, expulsion, infection, lost strings,  Reviewed heavier crampier menses bleeding pattern, risk of ectopic, efficacy > 99 %, insertion and  removal  BUM x7 days post insertion, effective for 10 years  She denies copper allergy  Information provided on IUD        Subjective:      Patient ID: Vladimir Singleton is a 33 y o  female  HPI 32 yo  here for Paragard IUD contraception counseling  Has used pills but since her delivery ( )  had moodiness with her pills  Hx of using Mirena iUD for 5 months but had bleeding everyday  LMP 2022 was normal x 7 days, usually 3 days  Took plan B around 2022 prior to he rmenses  Had another bleed 10/07/22 and was  light 2 5 days  Uses condoms occasionally  Last sexual relations 1 week ago, reports she used condoms  Negative UPT today    Has a new partner for 3 months, consents for [facility] and CT today  Has had Gardisil vaccines  The following portions of the patient's history were reviewed and updated as appropriate: allergies, current medications, past family history, past medical history, past social history, past surgical history and problem list     Review of Systems   Constitutional: Negative for chills and fever  Respiratory: Negative  Cardiovascular: Negative  Genitourinary: Negative for dyspareunia, pelvic pain and vaginal discharge  Objective:      /85   Pulse 75   Ht 5' 4" (1 626 m)   Wt 79 4 kg (175 lb)   LMP 10/07/2022   BMI 30 04 kg/m²          Physical Exam  Constitutional:       Appearance: Normal appearance     Cardiovascular: Rate and Rhythm: Normal rate and regular rhythm  Pulmonary:      Effort: Pulmonary effort is normal       Breath sounds: Normal breath sounds  Abdominal:      Tenderness: There is no abdominal tenderness  Skin:     General: Skin is warm and dry  Neurological:      Mental Status: She is oriented to person, place, and time     Psychiatric:         Mood and Affect: Mood normal          Behavior: Behavior normal

## 2022-10-18 ENCOUNTER — TELEPHONE (OUTPATIENT)
Dept: OBGYN CLINIC | Facility: CLINIC | Age: 27
End: 2022-10-18

## 2022-10-18 LAB
C TRACH DNA SPEC QL NAA+PROBE: NEGATIVE
N GONORRHOEA DNA SPEC QL NAA+PROBE: NEGATIVE

## 2022-10-18 NOTE — TELEPHONE ENCOUNTER
----- Message from Alexandrea Cowan, [address] sent at 10/18/2022  9:49 AM EDT -----    Culture for HOSP [facility] and CT Result is negative, please call patient to inform     thanks

## 2022-10-18 NOTE — TELEPHONE ENCOUNTER
Called and spoke to patient, and informed her of the negative GCCT test results  Patient verbalized understanding, and did not have any questions

## 2022-10-18 NOTE — RESULT ENCOUNTER NOTE
Culture for HOSP [facility] and CT Result is negative, please call patient to inform     thanks

## 2022-10-25 ENCOUNTER — PROCEDURE VISIT (OUTPATIENT)
Dept: OBGYN CLINIC | Facility: CLINIC | Age: 27
End: 2022-10-25

## 2022-10-25 VITALS
BODY MASS INDEX: 29.02 KG/M2 | RESPIRATION RATE: 18 BRPM | DIASTOLIC BLOOD PRESSURE: 78 MMHG | HEART RATE: 71 BPM | SYSTOLIC BLOOD PRESSURE: 115 MMHG | HEIGHT: 64 IN | WEIGHT: 170 LBS

## 2022-10-25 DIAGNOSIS — Z32.02 PREGNANCY TEST NEGATIVE: Primary | ICD-10-CM

## 2022-10-25 DIAGNOSIS — Z97.5 IUD (INTRAUTERINE DEVICE) IN PLACE: ICD-10-CM

## 2022-10-25 LAB — SL AMB POCT URINE HCG: NORMAL

## 2022-10-25 PROCEDURE — 58300 INSERT INTRAUTERINE DEVICE: CPT | Performed by: NURSE PRACTITIONER

## 2022-10-25 PROCEDURE — 81025 URINE PREGNANCY TEST: CPT | Performed by: NURSE PRACTITIONER

## 2022-10-25 RX ORDER — COPPER 313.4 MG/1
1 INTRAUTERINE DEVICE INTRAUTERINE ONCE
Status: COMPLETED | OUTPATIENT
Start: 2022-10-25 | End: 2022-10-25

## 2022-10-25 RX ADMIN — COPPER 1 INTRA UTERINE DEVICE: 313.4 INTRAUTERINE DEVICE INTRAUTERINE at 10:43

## 2022-10-25 NOTE — PROGRESS NOTES
Iud insertions    Date/Time: 10/25/2022 9:41 AM  Performed by: NAHEED Griffiths  Authorized by: NAHEED Griffiths     Other Assisting Provider: No    Verbal consent obtained?: Yes    Written consent obtained?: Yes    Risks and benefits: Risks, benefits and alternatives were discussed (risk of pain, bleeding, infection, risk of perforation 1/1000, risk of failure 1-2/100, risk of expulsion)    Consent given by:  Patient  Patient states understanding of procedure being performed: Yes    Patient's understanding of procedure matches consent: Yes    Required items: Required blood products, implants, devices and special equipment available (Paragard IUD available)    Patient identity confirmed:  Verbally with patient  Procedure:     Pelvic exam performed: yes      Negative GC/chlamydia test: yes      Negative urine pregnancy test: yes      Cervix cleaned and prepped: yes (betadine used)      Speculum placed in vagina: yes      Tenaculum applied to cervix: no      Uterus sounded: yes      Uterus sound depth (cm):  7    IUD inserted with no complications: yes      IUD type:  ParaGard    Strings trimmed: yes    Post-procedure:     Patient tolerated procedure well: yes      Patient will follow up after next period: yes    Comments:      Reviewed precautions  BUM x 7 days  RTO in 4 wks for IUD check

## 2022-11-22 ENCOUNTER — OFFICE VISIT (OUTPATIENT)
Dept: OBGYN CLINIC | Facility: CLINIC | Age: 27
End: 2022-11-22

## 2022-11-22 VITALS
SYSTOLIC BLOOD PRESSURE: 109 MMHG | WEIGHT: 175 LBS | HEART RATE: 63 BPM | DIASTOLIC BLOOD PRESSURE: 75 MMHG | HEIGHT: 64 IN | BODY MASS INDEX: 29.88 KG/M2 | RESPIRATION RATE: 18 BRPM

## 2022-11-22 DIAGNOSIS — Z97.5 IUD (INTRAUTERINE DEVICE) IN PLACE: Primary | ICD-10-CM

## 2022-11-22 NOTE — PROGRESS NOTES
Assessment/Plan:    No problem-specific Assessment & Plan notes found for this encounter  Annual due 2023      Diagnoses and all orders for this visit:    IUD (intrauterine device) in place    Other orders  -     PARAGARD INTRAUTERINE COPPER IU; by Intrauterine route          Subjective:      Patient ID: Esthela Malik is a 33 y o  female  HPI 32 yo  here for IUD check  She had a Paragard IUD that was placed 10/25/2022  Menses more crampy and heavier but is very happy with method  She denies any pain with sex  She denies any BTB or irregular bleeding, denies pelvic pain other than with her menses  Recommended to use Ibuprofen 600 mgs every  8 hours for a few days with menses that may help with pain and bleeding  Call with any concerns  The following portions of the patient's history were reviewed and updated as appropriate: allergies, current medications, past family history, past medical history, past social history, past surgical history and problem list     Review of Systems   Constitutional: Negative for chills and fever  Respiratory: Negative  Cardiovascular: Negative  Genitourinary: Negative for dyspareunia, menstrual problem, pelvic pain and vaginal discharge  Objective:      /75 (BP Location: Left arm, Patient Position: Sitting, Cuff Size: Adult)   Pulse 63   Resp 18   Ht 5' 4" (1 626 m)   Wt 79 4 kg (175 lb)   BMI 30 04 kg/m²          Physical Exam  Constitutional:       Appearance: Normal appearance  Cardiovascular:      Rate and Rhythm: Normal rate  Pulmonary:      Effort: Pulmonary effort is normal    Skin:     General: Skin is warm and dry  Neurological:      Mental Status: She is oriented to person, place, and time         External genitalia-no lesions or erythema  Vagina-currently has menses, moderate flow  Cervix-negative CMT no lesions, IUD strings shortened to 1 5 cm, uses menstrual cup  Uterus-anteverted, nontender  Adnexa-no masses nontender

## 2023-01-24 ENCOUNTER — ANNUAL EXAM (OUTPATIENT)
Dept: OBGYN CLINIC | Facility: CLINIC | Age: 28
End: 2023-01-24

## 2023-01-24 VITALS
SYSTOLIC BLOOD PRESSURE: 132 MMHG | HEIGHT: 64 IN | BODY MASS INDEX: 29.98 KG/M2 | WEIGHT: 175.6 LBS | DIASTOLIC BLOOD PRESSURE: 82 MMHG

## 2023-01-24 DIAGNOSIS — Z30.431 SURVEILLANCE OF INTRAUTERINE CONTRACEPTIVE DEVICE: ICD-10-CM

## 2023-01-24 DIAGNOSIS — Z01.419 ENCOUNTER FOR GYNECOLOGICAL EXAMINATION WITHOUT ABNORMAL FINDING: Primary | ICD-10-CM

## 2023-01-24 NOTE — PROGRESS NOTES
Assessment / Plan    1  Encounter for gynecological examination without abnormal finding  Normal well woman exam  2020 pap/hpv negative  Repeat next year  10/2022 G/C negative    2  Surveillance of intrauterine contraceptive device  Normal IUD check        Subjective      Harry Martínez is a 33 y o  female who presents for her annual gynecologic exam     34 yo   10/25/22 Paragard IUD placed    2020 pap/HPV negative  Pap hx: NL  STD screening: 10/2022 G/C negative  STD hx: negative  Sexually active: yes; new partner of 5 months  Condom use: no (counseled)  Gardasil vaccine: completed    Periods are regular   Current contraception: IUD  History of abnormal Pap smear: no  Family history of breast,uterine, ovarian or colon cancer: yes - MGM breast ca    Menstrual History:  OB History        2    Para   1    Term   1       0    AB   1    Living   1       SAB   0    IAB   1    Ectopic   0    Multiple   0    Live Births   1           Obstetric Comments   Menarche: 13  Cycles regular, Q29d, x 4d              Patient's last menstrual period was 2023 (exact date)  The following portions of the patient's history were reviewed and updated as appropriate: allergies, current medications, past family history, past medical history, past social history, past surgical history and problem list     Review of Systems      Review of Systems   Constitutional: Negative for chills and fever  Respiratory: Negative for cough and shortness of breath  Gastrointestinal: Negative for abdominal distention, abdominal pain, blood in stool, constipation, diarrhea, nausea and vomiting  Genitourinary: Negative for difficulty urinating, dysuria, frequency, genital sores, hematuria, menstrual problem, pelvic pain, urgency, vaginal bleeding and vaginal discharge  Musculoskeletal: Negative for arthralgias and myalgias       Breasts:  Negative for skin changes, dimpling, asymmetry, nipple discharge, redness, tenderness or palpable masses    Objective      /82 (BP Location: Left arm, Patient Position: Sitting, Cuff Size: Standard)   Ht 5' 4" (1 626 m)   Wt 79 7 kg (175 lb 9 6 oz)   LMP 01/01/2023 (Exact Date)   BMI 30 14 kg/m²   Physical Exam  Constitutional:       General: She is not in acute distress  Appearance: Normal appearance  She is well-developed  She is not ill-appearing or diaphoretic  Comments: bmi 30 1   HENT:      Head: Normocephalic and atraumatic  Eyes:      Pupils: Pupils are equal, round, and reactive to light  Neck:      Thyroid: No thyromegaly  Pulmonary:      Effort: Pulmonary effort is normal    Chest:   Breasts:     Breasts are symmetrical       Right: No inverted nipple, mass, nipple discharge, skin change or tenderness  Left: No inverted nipple, mass, nipple discharge, skin change or tenderness  Abdominal:      General: There is no distension  Palpations: Abdomen is soft  There is no mass  Tenderness: There is no abdominal tenderness  There is no guarding or rebound  Genitourinary:     General: Normal vulva  Exam position: Lithotomy position  Labia:         Right: No rash, tenderness, lesion or injury  Left: No rash, tenderness, lesion or injury  Vagina: No signs of injury and foreign body  No vaginal discharge, erythema, tenderness or bleeding  Cervix: No cervical motion tenderness, discharge or friability  Uterus: Not enlarged and not tender  Adnexa:         Right: No mass or tenderness  Left: No mass or tenderness  Musculoskeletal:      Cervical back: Neck supple  Lymphadenopathy:      Cervical: No cervical adenopathy  Upper Body:      Right upper body: No supraclavicular adenopathy  Left upper body: No supraclavicular adenopathy  Skin:     General: Skin is warm and dry  Neurological:      General: No focal deficit present        Mental Status: She is alert and oriented to person, place, and time  Psychiatric:         Mood and Affect: Mood normal          Behavior: Behavior normal          Thought Content:  Thought content normal          Judgment: Judgment normal

## 2023-03-21 NOTE — PROGRESS NOTES
Assessment/Plan:    No problem-specific Assessment & Plan notes found for this encounter  Annual due 1/24/2024     Diagnoses and all orders for this visit:    Encounter for counseling regarding contraception      Patient desires removal of IUD, return to office for removal    Subjective:      Patient ID: Fernando Long is a 33 y o  female  HPI 31-year-old here for contraceptive counseling  She had a ParaGard that was placed 10/25/2022  Patient reports she would like removal due to currently not being sexually active, and due to computer heavier periods  Monthly x7-10 days, they are heavier usually after day 4  She reports at this time she does not want any other contraception  She does report if she becomes sexually active she may want to get another IUD, reviewed insurance may not cover since she had IUD removed early, she reports she understands and if so she would pay for it herself     We also discussed she can use condoms if contraception needed, also decrease STD risk      The IUD  removal procedure reviewed with patient  The following portions of the patient's history were reviewed and updated as appropriate: allergies, current medications, past family history, past medical history, past social history, past surgical history and problem list     Review of Systems   Constitutional: Negative for chills and fever  Respiratory: Negative  Cardiovascular: Negative  Gastrointestinal: Negative for abdominal pain  Genitourinary: Positive for menstrual problem  Negative for vaginal discharge  Objective:      /75 (BP Location: Left arm, Patient Position: Sitting, Cuff Size: Adult)   Pulse 63   Ht 5' 4" (1 626 m)   Wt 81 6 kg (180 lb)   LMP 03/08/2023   Breastfeeding No   BMI 30 90 kg/m²          Physical Exam  Constitutional:       Appearance: Normal appearance  Cardiovascular:      Rate and Rhythm: Normal rate and regular rhythm     Pulmonary:      Effort: Pulmonary effort is normal       Breath sounds: Normal breath sounds  Abdominal:      Palpations: Abdomen is soft  Tenderness: There is no abdominal tenderness  Neurological:      Mental Status: She is oriented to person, place, and time     Psychiatric:         Mood and Affect: Mood normal          Behavior: Behavior normal

## 2023-03-22 ENCOUNTER — OFFICE VISIT (OUTPATIENT)
Dept: OBGYN CLINIC | Facility: CLINIC | Age: 28
End: 2023-03-22

## 2023-03-22 VITALS
SYSTOLIC BLOOD PRESSURE: 122 MMHG | HEIGHT: 64 IN | HEART RATE: 63 BPM | BODY MASS INDEX: 30.73 KG/M2 | WEIGHT: 180 LBS | DIASTOLIC BLOOD PRESSURE: 75 MMHG

## 2023-03-22 DIAGNOSIS — Z30.09 ENCOUNTER FOR COUNSELING REGARDING CONTRACEPTION: Primary | ICD-10-CM

## 2023-03-29 ENCOUNTER — PROCEDURE VISIT (OUTPATIENT)
Dept: OBGYN CLINIC | Facility: CLINIC | Age: 28
End: 2023-03-29

## 2023-03-29 VITALS
HEART RATE: 69 BPM | SYSTOLIC BLOOD PRESSURE: 129 MMHG | BODY MASS INDEX: 30.73 KG/M2 | DIASTOLIC BLOOD PRESSURE: 78 MMHG | HEIGHT: 64 IN | WEIGHT: 180 LBS

## 2023-03-29 DIAGNOSIS — Z30.432 ENCOUNTER FOR IUD REMOVAL: Primary | ICD-10-CM

## 2023-03-29 NOTE — PROGRESS NOTES
Iud removal    Date/Time: 3/29/2023 1:42 PM  Performed by: NAHEED Briscoe  Authorized by: NAHEED Briscoe   Universal Protocol:  Consent: Verbal consent obtained  Written consent obtained  Risks and benefits: risks, benefits and alternatives were discussed (Risks reviewed of pain, bleeding, difficult removal)  Consent given by: patient  Patient understanding: patient states understanding of the procedure being performed  Patient consent: the patient's understanding of the procedure matches consent given  Procedure consent: procedure consent matches procedure scheduled  Patient identity confirmed: verbally with patient      Procedure:     Removed with no complications: yes      Removal due to mechanical complications of IUD: no      Removal due to infection and inflammatory reaction: no      Other reason for removal:  Pt request, not needed per pt  Comments:      Paragard IUD removed easily and intact  Declines contraception at this time

## 2023-06-12 ENCOUNTER — APPOINTMENT (OUTPATIENT)
Dept: LAB | Age: 28
End: 2023-06-12
Payer: COMMERCIAL

## 2023-06-12 ENCOUNTER — TELEPHONE (OUTPATIENT)
Dept: OBGYN CLINIC | Facility: CLINIC | Age: 28
End: 2023-06-12

## 2023-06-12 DIAGNOSIS — N94.89 SUPPRESSION OF MENSTRUATION: Primary | ICD-10-CM

## 2023-06-12 LAB — HCG SERPL QL: POSITIVE

## 2023-06-12 PROCEDURE — 36415 COLL VENOUS BLD VENIPUNCTURE: CPT

## 2023-06-12 PROCEDURE — 84703 CHORIONIC GONADOTROPIN ASSAY: CPT

## 2023-06-12 NOTE — TELEPHONE ENCOUNTER
Pt called to schedule confirmation of pregnancy    LMP: 05/05/23    Lab work order: HCG qual   Ultrasound appt:06/27/23   OB intake: need to be scheduled

## 2023-06-27 ENCOUNTER — ULTRASOUND (OUTPATIENT)
Dept: OBGYN CLINIC | Facility: CLINIC | Age: 28
End: 2023-06-27
Payer: COMMERCIAL

## 2023-06-27 DIAGNOSIS — O36.80X1 ENCOUNTER TO DETERMINE FETAL VIABILITY OF PREGNANCY, FETUS 1: Primary | ICD-10-CM

## 2023-06-27 PROCEDURE — 76801 OB US < 14 WKS SINGLE FETUS: CPT | Performed by: OBSTETRICS & GYNECOLOGY

## 2023-06-27 NOTE — PROGRESS NOTES
SV AHP=159 BPM  CRL=13 3 mm=7w4d  EDC=2/9/2024    UT=90 x 62 x 67 mm  RT OV=28 x 26 x 24 mm  LT OV=31 x 22 x 21 mm

## 2023-07-06 ENCOUNTER — OB ABSTRACT (OUTPATIENT)
Dept: OBGYN CLINIC | Facility: CLINIC | Age: 28
End: 2023-07-06

## 2023-07-12 ENCOUNTER — INITIAL PRENATAL (OUTPATIENT)
Dept: OBGYN CLINIC | Facility: CLINIC | Age: 28
End: 2023-07-12

## 2023-07-12 VITALS — WEIGHT: 175 LBS | HEIGHT: 64 IN | BODY MASS INDEX: 29.88 KG/M2

## 2023-07-12 DIAGNOSIS — Z34.81 SUPERVISION OF NORMAL INTRAUTERINE PREGNANCY IN MULTIGRAVIDA, FIRST TRIMESTER: Primary | ICD-10-CM

## 2023-07-12 PROCEDURE — OBC

## 2023-07-12 NOTE — PROGRESS NOTES
The patient was identified by name and date of birth and was informed that this is a virtual visit being conducted through a secure, HIPAA-complaint platform. I am in a private office space with the door closed. Patient acknowledged understanding of privacy. She agrees to proceed and is aware that she may discontinue the visit at any time. OB INTAKE INTERVIEW    OB History    Para Term  AB Living   3 1 1 0 1 1   SAB IAB Ectopic Multiple Live Births   0 1 0 0 1      # Outcome Date GA Lbr Rik/2nd Weight Sex Delivery Anes PTL Lv   3 Current            2 IAB 21     TAB         Birth Comments: medically induced ab   1 Term 20 39w4d / 01:13 3230 g (7 lb 1.9 oz) F Vag-Spont EPI  SUZANNE      Obstetric Comments   Menarche: 13   Cycles regular, Q29d, x 4d        has a past medical history of Asthma (), Depression (2020), Echogenic focus of heart of fetus affecting antepartum care of mother (2019), Oral herpes, Post partum depression (2020), and Varicella. has a past surgical history that includes Kidney surgery (Bilateral) and REMOVAL OF INTRAUTERINE DEVICE (IUD) (10/08/2020). is allergic to pollen extract. Current Outpatient Medications:   •  Prenatal Vit-Fe Fumarate-FA (PRENATAL VITAMIN PO), Take by mouth, Disp: , Rfl:   •  PARAGARD INTRAUTERINE COPPER IU, by Intrauterine route 10/2022, Disp: , Rfl:     family history includes Alcohol abuse in her paternal grandfather; Asthma in her father; Breast cancer in her maternal grandmother; Bronchiolitis in her father; Cancer in her paternal grandfather; Depression in her father; Diabetes in her maternal grandmother; Heart defect in her sister; Hypertension in her family; No Known Problems in her brother, daughter, maternal grandfather, and mother; Prostate cancer in her paternal grandfather; Supraventricular tachycardia in her sister;  Thyroid disease in her maternal grandmother, paternal aunt, and paternal grandmother. Last Menstrual Period: Patient's last menstrual period was 2023. Estimated Date of Delivery:     Андрей Hogan is a 33 y.o. female  3 para 3 pregnant female who presents for her obstetrical intake. This pregnancy was not planned. Unsure of father involvement at this time. Pregnancy symptoms: breast tenderness, fatigue and nausea          BMI: Body mass index is Body mass index is 30.04 kg/m². Discussed appropriate weight gain in pregnancy based on pre-gravid BMI. Diabetes  First degree relative with type 2 diabetes:no              Pregestational DM:  no              hx of GDM: no              hx of PCOS: no              prior hx of LGA/macrosomia:no           Hypertension   Age 28 or older:no   Obesity (BMI over 30):no              Hx of chronic HTN: no              hx of gestational HTN:no              hx of preeclampsia, eclampsia, or HELLP syndrome: no              Family h/o preeclampsia:no    Autoimmune disease (systemic lupus erythematosus, antiphospholipid antibody syndrome):no   Nulliparity:no              Multifetal gestation: no  More than 10 year pregnancy interval:no  Previous IUGR, low birthweight or small for gestational age:no     Infection Screening              Does the pt have a hx of MRSA? no              Recent travel outside of [place]? no     Thyroid- if yes order TSH with reflex T4  History of thyroid disease no    Bleeding Disorder or Hx of DVT-patient or first degree relative with history of. Order the following if not done previously.    (Factor V, antithrombin III, prothrombin gene mutation, protein C and S Ag, lupus anticoagulant, anticardiolipin, beta-2 glycoprotein)   no    OB/GYN-  History of abnormal pap smear no  History of HPV no  History of Herpes/HSV no  History of other STI (gonorrhea, chlamydia, trich) no    History of blood transfusion no  Ok for blood transfusion Yes     Substance screening  Social History     Tobacco Use   • Smoking status: Never   • Smokeless tobacco: Never   Vaping Use   • Vaping Use: Never used   Substance Use Topics   • Alcohol use: Not Currently     Alcohol/week: 3.0 standard drinks of alcohol     Types: 3 Standard drinks or equivalent per week   • Drug use: Never       Genetic/MFM-  See prenatal genetic history screening in the prenatal episode for genetic history. Discussed carrier screening and consultation with MFM. Patient is not interested. Referral to MFM given for anatomy (level 2) scan. Cystic Fibrosis Screening offered. Patient was advised to check insurance coverage prior to testing. Patient is not interested. Will call back if she changes her mind on CF screening. Orders Placed This Encounter   Procedures   • Urine culture   • Hepatitis C antibody   • HIV 1/2 AG/AB w Reflex [facility] for 2 yr old and above   • Hepatitis C antibody   • UA (URINE) with reflex to Scope   • Hepatitis B surface antigen   • CBC and differential   • Rubella antibody, IgG   • RPR-Syphilis Screening (Total Syphilis IGG/IGM)   • Hepatitis B surface antibody   • Anemia Panel w/Reflex, OB   • Ambulatory Referral to Maternal Fetal Medicine   • Type and screen     Prenatal lab work scripts given to patient for routine testing. Patient was advised to have initial prenatal blood work, that was ordered today, done within the next week. Verbalized understanding. Breast Feeding  Breastfeeding benefits discussed and patient does plan to breastfeed. Immunizations:  Discussed Flu, COVID, and Tdap, vaccinations. Interview education  Anticipated course of prenatal care including how and when to contact providers reviewed. HIV and other prenatal labs and testing discussed. Genetic testing discussed. Patient will check insurance coverage. Indications for ultrasonography reviewed. Use of any medications (including supplements, vitamins, herbs or OTC drugs) discussed.   Reinforced daily use of prenatal vitamin. Influenza, Covid, and Tdap vaccines counseled and recommendations reviewed. Proper dental care discussed. Weight gain counseling discussed including nutrition counseling; special diet, dietary precautions (mercury, listeriosis). Reviewed exercise recommendations and routine restrictions for activity including no lifting greater than 20 lbs   Environmental/work hazards reviewed including temperature guidelines and no prolonged stationary standing. Avoidance of saunas, hot tubs, and tanning beds reviewed. Toxoplasmosis precautions (cats/raw meat/unwashed vegetables) discussed. Tobacco/smoking, alcohol, and illicit/recreation drug usage reviewed. Travel safety precautions reviewed including avoiding travel where risk of congenitally transmitted infection(s) is high. Proper seat belt use discussed. Pregnancy packet/folder provided and review with patient. Information on childbirth classes/hospital facilities and Baby and Me resources provided. The patient was oriented to our practice, reviewed delivering physicians and [address] [place] [address] for Delivery. All questions were answered. Patient to return to the office for first routine prenatal appointment with the provider. This appointment is scheduled. MyChart education provided. Patient instructed to always contact the office via phone with any urgent needs or concerns and not to utilize Mychart for emergencies. Additional details that I feel the provider should be aware of:  SANG has a cousin with sickle cell disease.      Interviewed by: Onel Perry LPN

## 2023-07-12 NOTE — PATIENT INSTRUCTIONS
Congratulations!! Please review our Pregnancy Essential Guide and [facility] [employer] L&D Virtual tour [email or web address].  [facility] Pregnancy Essentials Guide  [facility] ([email or web address])      [facility]. [facility] on [employer]          Nausea and Vomiting in Pregnancy   WHAT YOU NEED TO KNOW:   What do I need to know about nausea and vomiting in pregnancy? Nausea and vomiting can happen any time of day. These symptoms usually start before the 9th week of pregnancy, and end by the 14th week (second trimester). Some women can have nausea and vomiting for a longer time. These symptoms can make it hard for you to do your daily activities. What increases my risk for nausea and vomiting in pregnancy? Being pregnant with more than one baby    Nausea and vomiting in a past pregnancy    Having a sister or mother who had nausea and vomiting during pregnancy    History of migraine headaches or motion sickness    Being pregnant with a female baby    How is nausea and vomiting in pregnancy treated? Treatment for nausea and vomiting in pregnancy is usually not needed. You can make changes in the foods you eat and in your activities to help manage your symptoms. You may need to try several things to learn what works for you. Talk to your healthcare provider if your symptoms do not decrease with the changes suggested below. What nutrition changes can I make to manage nausea and vomiting? Eat smaller meals, more often. Eat a small snack, such as crackers, dry cereal, or a small sandwich before you go to bed. Eat some crackers or dry toast before you get out of bed in the morning. Get out of bed slowly. Sudden movements could cause you to get dizzy and nauseated. Eat bland foods when you feel nauseated. Examples of bland foods include dry toast, dry cereal, plain pasta, white rice, and bread. Other bland foods include saltine crackers, bananas, gelatin, and pretzels. Avoid spicy, greasy, and fried foods. Drink liquids that contain ginger. Drink ginger ale made with real ginger or ginger tea made with fresh grated ginger. Ginger capsules or ginger candies may also help to decrease nausea and vomiting. Drink liquids between meals instead of with meals. Wait at least 30 minutes after you eat to drink liquids. Drink small amounts of liquids often throughout the day to prevent dehydration. Ask how much liquid you should drink each day. What other changes can I make to manage nausea and vomiting? Avoid smells that bother you. Strong odors may cause nausea and vomiting to start, or make it worse. Do not brush your teeth right after you eat  if it makes you nauseated. Rest when you need to. Start activity slowly and work up to your usual routine as you start to feel better. Talk to your healthcare provider about your prenatal vitamins. Prenatal vitamins can cause nausea for some women. Try taking your prenatal vitamin at night or with a snack. If this change does not help, your healthcare provider may recommend a different type of vitamin. Light to moderate exercise  may help to decrease your symptoms. It may also help you to sleep better at night. Ask your healthcare provider about the best exercise plan for you. When should I seek immediate care? You are dizzy, cold, and thirsty, and your eyes and mouth are dry. You are urinating very little or not at all. You are dizzy or lightheaded when you stand up. You see blood or material that looks like coffee grounds in your vomit. When should I call my doctor? You vomit more than 4 times in 1 day. You have not been able to keep liquids down for more than 1 day. You lose more than 2 pounds. You have a fever. Your nausea and vomiting continue longer than 14 weeks. You have questions or concerns about your condition or care.     CARE AGREEMENT:   You have the right to help plan your care. Learn about your health condition and how it may be treated. Discuss treatment options with your healthcare providers to decide what care you want to receive. You always have the right to refuse treatment. The above information is an  only. It is not intended as medical advice for individual conditions or treatments. Talk to your doctor, nurse or pharmacist before following any medical regimen to see if it is safe and effective for you. © Copyright [employer] 2022 Information is for End User's use only and may not be sold, redistributed or otherwise used for commercial purposes. Pregnancy   WHAT YOU NEED TO KNOW:   What do I need to know about pregnancy? A normal pregnancy lasts about 40 weeks. The first trimester lasts from your last period through the 12th week of pregnancy. The second trimester lasts from the 13th week through the 23rd week. The third trimester lasts from the 24th week until your baby is born. If you know the date of your last period, your healthcare provider can estimate your due date. You may give birth to your baby any time from 37 weeks to 2 weeks after your due date. What is prenatal care? Prenatal care is a series of visits with your healthcare provider throughout your pregnancy. Prenatal care can help prevent problems during pregnancy and childbirth. At each prenatal visit, your healthcare provider will weigh you and check your blood pressure. He or she will also check your baby's heartbeat and growth. You may need the following at some visits:  A pelvic exam  allows your healthcare provider to see your cervix (the bottom part of your uterus). Your provider will use a speculum to open your vagina. He or she will check the size and shape of your uterus. At your first prenatal visit, you may also have a Pap smear. This is a test to check your cervix for abnormal cells.     Blood tests  may be done to check for any of the following:    Gestational diabetes or anemia (low iron level)    Blood type or Rh factor, or certain birth defects    Immunity to certain diseases, such as chickenpox or rubella    An infection, such as a sexually transmitted infection, HIV, or hepatitis B    Hepatitis B  may need to be prevented or treated. Hepatitis B is inflammation of the liver caused by the hepatitis B virus (HBV). HBV can spread from a mother to her baby during delivery. You will be checked for HBV as early as possible in the first trimester of each pregnancy. You need the test even if you received the hepatitis B vaccine or were tested before. You may need to have an HBV infection treated before you give birth. Urine tests  may also be done to check for sugar and protein. These can be signs of gestational diabetes or preeclampsia. Urine tests may also be done to check for signs of infection. A gestational diabetes screen  may be done. Your healthcare provider may order either a 1-step or 2-step oral glucose tolerance test (OGTT). 1-step OGTT:  Your blood sugar level will be tested after you have not eaten for 8 hours (fasting). You will then be given a glucose drink. Your level will be tested again 1 hour and 2 hours after you finish the drink. 2-step OGTT:  You do not have to fast for the first part of the test. You will have the glucose drink at any time of day. Your blood sugar level will be checked 1 hour later. If your blood sugar is higher than a certain level, another test will be ordered. You will fast and your blood sugar level will be tested. You will have the glucose drink. Your blood will be tested again 1 hour, 2 hours, and 3 hours after you finish the glucose drink. A fetal ultrasound  shows pictures of your baby inside your uterus. The pictures are used to check your baby's development, movement, and position. Genetic disorder screening tests  may be offered to you.  These tests check your baby's risk for genetic disorders such as Down syndrome. A screening test may include blood tests and an ultrasound. Blood tests may be used to check your DNA or your partner's DNA. Genetic tests are not always accurate or complete. Your baby may be born with a genetic disorder that did not show up in the tests. Talk to your healthcare provider about any concerns you have with genetic testing. What can I do to have a healthy pregnancy? Eat a variety of healthy foods. Healthy foods include fruits, vegetables, whole-grain breads, low-fat dairy foods, beans, lean meats, and fish. Drink liquids as directed. Ask how much liquid to drink each day and which liquids are best for you. Limit caffeine to less than 200 milligrams each day. Limit your intake of fish to 2 servings each week. Choose fish low in mercury such as canned light tuna, shrimp, crab, salmon, cod, or tilapia. Do not  eat fish high in mercury such as swordfish, tilefish, nicole mackerel, and shark. Take prenatal vitamins as directed. Your need for certain vitamins and minerals, such as folic acid, increases during pregnancy. Prenatal vitamins provide some of the extra vitamins and minerals you need. Prenatal vitamins may also help to decrease the risk for certain birth defects. Ask how much weight you should gain during your pregnancy. Too much or too little weight gain can be unhealthy for you and your baby. Talk to your healthcare provider about exercise. Moderate exercise can help you stay fit. Your healthcare provider will help you plan an exercise program that is safe for you during pregnancy. Do not smoke. Smoking increases your risk for a miscarriage and heart and blood vessel problems. Smoking can cause your baby to be born too early or weigh less at birth. Quit smoking as soon as you think you might be pregnant. Ask your healthcare provider for information if you need help quitting. Do not drink alcohol.   Alcohol passes from your body to your baby through the placenta. It can affect your baby's brain development and cause fetal alcohol syndrome (FAS). FAS is a group of conditions that causes mental, behavior, and growth problems. Talk to your healthcare provider before you take any medicines. Many medicines may harm your baby if you take them when you are pregnant. Do not take any medicines, vitamins, herbs, or supplements without first talking to your healthcare provider. Never use illegal or street drugs (such as marijuana or cocaine) while you are pregnant. What body changes may happen during my pregnancy? Breast changes  you will experience include tenderness and tingling during the early part of your pregnancy. Your breasts will become larger. You may need to use a support bra. You may see a thin, yellow fluid, called colostrum, leak from your nipples during the second trimester. Colostrum is a liquid that changes to milk about 3 days after you give birth. Skin changes and stretch marks  may occur during your pregnancy. You may have red marks, called stretch marks, on your skin. Stretch marks will usually fade after pregnancy. Use lotion if your skin is dry and itchy. The skin on your face, around your nipples, and below your belly button may darken. Most of the time, your skin will return to its normal color after your baby is born. Morning sickness  is nausea and vomiting that can happen at any time of day. Avoid fatty and spicy foods. Eat small meals throughout the day instead of large meals. Anjali may help to decrease nausea. Ask your healthcare provider about other ways of decreasing nausea and vomiting. Heartburn  may be caused by changes in your hormones during pregnancy. Your growing uterus may also push your stomach upward and force stomach acid to back up into your esophagus. Eat 4 or 5 small meals each day instead of large meals. Avoid spicy foods. Avoid eating right before bedtime.          Constipation  may develop during your pregnancy. To treat constipation, eat foods high in fiber such as fiber cereals, beans, fruits, vegetables, whole-grain breads, and prune juice. Get regular exercise and drink plenty of water. Your healthcare provider may also suggest a fiber supplement to soften your bowel movements. Talk to your healthcare provider before you use any medicines to decrease constipation. Hemorrhoids  are enlarged veins in the rectal area. They may cause pain, itching, and bright red bleeding from your rectum. To decrease your risk for hemorrhoids, prevent constipation and do not strain to have a bowel movement. If you have hemorrhoids, soak in a tub of warm water to ease discomfort. Ask your healthcare provider how you can treat hemorrhoids. Leg cramps and swelling  may be caused by low calcium levels or the added weight of pregnancy. Raise your legs above the level of your heart to decrease swelling. During a leg cramp, stretch or massage the muscle that has the cramp. Heat may help decrease pain and muscle spasms. Apply heat on your muscle for 20 to 30 minutes every 2 hours for as many days as directed. Back pain  may occur as your baby grows. Do not stand for long periods of time or lift heavy items. Use good posture while you stand, squat, or bend. Wear low-heeled shoes with good support. Rest may also help to relieve back pain. Ask your healthcare provider about exercises you can do to strengthen your back muscles. What are some safety tips during pregnancy? Avoid hot tubs and saunas. Do not use a hot tub or sauna while you are pregnant, especially during your first trimester. Hot tubs and saunas may raise your baby's temperature and increase the risk for birth defects. Avoid toxoplasmosis. This is an infection caused by eating raw meat or being around infected cat feces. It can cause birth defects, miscarriages, and other problems. Wash your hands after you touch raw meat.  Make sure any meat is well-cooked before you eat it. Avoid raw eggs and unpasteurized milk. Use gloves or ask someone else to clean your cat's litter box while you are pregnant. Ask your healthcare provider about travel. The most comfortable time to travel is during the second trimester. Ask your healthcare provider if you can travel after 36 weeks. You may not be able to travel in an airplane after 36 weeks. He or she may also recommend that you avoid long road trips. When should I seek immediate care? You develop a severe headache that does not go away. You have new or increased vision changes, such as blurred or spotted vision. You have new or increased swelling in your face or hands. You have pain or cramping in your abdomen or low back. You have vaginal bleeding. When should I call my doctor or obstetrician? You have abdominal cramps, pressure, or tightening. You have a change in vaginal discharge. You cannot keep food or drinks down, and you are losing weight. You have chills or a fever. You have vaginal itching, burning, or pain. You have yellow, green, white, or foul-smelling vaginal discharge. You have pain or burning when you urinate, less urine than usual, or pink or bloody urine. You have questions or concerns about your condition or care. CARE AGREEMENT:   You have the right to help plan your care. Learn about your health condition and how it may be treated. Discuss treatment options with your healthcare providers to decide what care you want to receive. You always have the right to refuse treatment. The above information is an  only. It is not intended as medical advice for individual conditions or treatments. Talk to your doctor, nurse or pharmacist before following any medical regimen to see if it is safe and effective for you.   © Copyright Venetta Snide 2022 Information is for End User's use only and may not be sold, redistributed or otherwise used for commercial purposes.

## 2023-07-14 ENCOUNTER — OB ABSTRACT (OUTPATIENT)
Dept: OBGYN CLINIC | Facility: CLINIC | Age: 28
End: 2023-07-14

## 2023-07-18 ENCOUNTER — LAB (OUTPATIENT)
Dept: LAB | Age: 28
End: 2023-07-18
Payer: COMMERCIAL

## 2023-07-18 DIAGNOSIS — Z34.81 SUPERVISION OF NORMAL INTRAUTERINE PREGNANCY IN MULTIGRAVIDA, FIRST TRIMESTER: ICD-10-CM

## 2023-07-18 LAB
ABO GROUP BLD: NORMAL
BASOPHILS # BLD AUTO: 0.06 THOUSANDS/ÂΜL (ref 0–0.1)
BASOPHILS NFR BLD AUTO: 1 % (ref 0–1)
BILIRUB UR QL STRIP: NEGATIVE
BLD GP AB SCN SERPL QL: NEGATIVE
CLARITY UR: CLEAR
COLOR UR: COLORLESS
EOSINOPHIL # BLD AUTO: 0.11 THOUSAND/ÂΜL (ref 0–0.61)
EOSINOPHIL NFR BLD AUTO: 1 % (ref 0–6)
ERYTHROCYTE [DISTWIDTH] IN BLOOD BY AUTOMATED COUNT: 12.4 % (ref 11.6–15.1)
GLUCOSE UR STRIP-MCNC: NEGATIVE MG/DL
HCT VFR BLD AUTO: 40.5 % (ref 34.8–46.1)
HGB BLD-MCNC: 14.3 G/DL (ref 11.5–15.4)
HGB UR QL STRIP.AUTO: NEGATIVE
IMM GRANULOCYTES # BLD AUTO: 0.07 THOUSAND/UL (ref 0–0.2)
IMM GRANULOCYTES NFR BLD AUTO: 1 % (ref 0–2)
KETONES UR STRIP-MCNC: NEGATIVE MG/DL
LEUKOCYTE ESTERASE UR QL STRIP: NEGATIVE
LYMPHOCYTES # BLD AUTO: 2.17 THOUSANDS/ÂΜL (ref 0.6–4.47)
LYMPHOCYTES NFR BLD AUTO: 28 % (ref 14–44)
MCH RBC QN AUTO: 29.4 PG (ref 26.8–34.3)
MCHC RBC AUTO-ENTMCNC: 35.3 G/DL (ref 31.4–37.4)
MCV RBC AUTO: 83 FL (ref 82–98)
MONOCYTES # BLD AUTO: 0.52 THOUSAND/ÂΜL (ref 0.17–1.22)
MONOCYTES NFR BLD AUTO: 7 % (ref 4–12)
NEUTROPHILS # BLD AUTO: 4.95 THOUSANDS/ÂΜL (ref 1.85–7.62)
NEUTS SEG NFR BLD AUTO: 62 % (ref 43–75)
NITRITE UR QL STRIP: NEGATIVE
NRBC BLD AUTO-RTO: 0 /100 WBCS
PH UR STRIP.AUTO: 6.5 [PH]
PLATELET # BLD AUTO: 241 THOUSANDS/UL (ref 149–390)
PMV BLD AUTO: 11.5 FL (ref 8.9–12.7)
PROT UR STRIP-MCNC: NEGATIVE MG/DL
RBC # BLD AUTO: 4.87 MILLION/UL (ref 3.81–5.12)
RH BLD: POSITIVE
RUBV IGG SERPL IA-ACNC: 45.7 IU/ML
SP GR UR STRIP.AUTO: 1 (ref 1–1.03)
SPECIMEN EXPIRATION DATE: NORMAL
UROBILINOGEN UR STRIP-ACNC: <2 MG/DL
WBC # BLD AUTO: 7.88 THOUSAND/UL (ref 4.31–10.16)

## 2023-07-18 PROCEDURE — 87389 HIV-1 AG W/HIV-1&-2 AB AG IA: CPT

## 2023-07-18 PROCEDURE — 87086 URINE CULTURE/COLONY COUNT: CPT

## 2023-07-18 PROCEDURE — 86803 HEPATITIS C AB TEST: CPT

## 2023-07-18 PROCEDURE — 86850 RBC ANTIBODY SCREEN: CPT

## 2023-07-18 PROCEDURE — 86901 BLOOD TYPING SEROLOGIC RH(D): CPT

## 2023-07-18 PROCEDURE — 86780 TREPONEMA PALLIDUM: CPT

## 2023-07-18 PROCEDURE — 86900 BLOOD TYPING SEROLOGIC ABO: CPT

## 2023-07-18 PROCEDURE — 87340 HEPATITIS B SURFACE AG IA: CPT

## 2023-07-18 PROCEDURE — 81003 URINALYSIS AUTO W/O SCOPE: CPT

## 2023-07-18 PROCEDURE — 85025 COMPLETE CBC W/AUTO DIFF WBC: CPT

## 2023-07-18 PROCEDURE — 36415 COLL VENOUS BLD VENIPUNCTURE: CPT

## 2023-07-18 PROCEDURE — 86762 RUBELLA ANTIBODY: CPT

## 2023-07-19 LAB
BACTERIA UR CULT: NORMAL
HBV SURFACE AG SER QL: NORMAL
HCV AB SER QL: NORMAL
HIV 1+2 AB+HIV1 P24 AG SERPL QL IA: NORMAL
HIV 2 AB SERPL QL IA: NORMAL
HIV1 AB SERPL QL IA: NORMAL
HIV1 P24 AG SERPL QL IA: NORMAL
TREPONEMA PALLIDUM IGG+IGM AB [PRESENCE] IN SERUM OR PLASMA BY IMMUNOASSAY: NORMAL

## 2023-07-25 ENCOUNTER — TELEPHONE (OUTPATIENT)
Dept: PERINATAL CARE | Facility: OTHER | Age: 28
End: 2023-07-25

## 2023-08-09 ENCOUNTER — ROUTINE PRENATAL (OUTPATIENT)
Dept: OBGYN CLINIC | Facility: CLINIC | Age: 28
End: 2023-08-09

## 2023-08-09 VITALS
BODY MASS INDEX: 31.58 KG/M2 | HEIGHT: 64 IN | DIASTOLIC BLOOD PRESSURE: 72 MMHG | OXYGEN SATURATION: 99 % | SYSTOLIC BLOOD PRESSURE: 118 MMHG | WEIGHT: 185 LBS

## 2023-08-09 DIAGNOSIS — Z34.81 SUPERVISION OF NORMAL INTRAUTERINE PREGNANCY IN MULTIGRAVIDA, FIRST TRIMESTER: Primary | ICD-10-CM

## 2023-08-09 DIAGNOSIS — Z11.3 SCREENING EXAMINATION FOR STD (SEXUALLY TRANSMITTED DISEASE): ICD-10-CM

## 2023-08-09 DIAGNOSIS — Z12.4 ENCOUNTER FOR PAPANICOLAOU SMEAR FOR CERVICAL CANCER SCREENING: ICD-10-CM

## 2023-08-09 PROCEDURE — PNV: Performed by: NURSE PRACTITIONER

## 2023-08-09 PROCEDURE — G0145 SCR C/V CYTO,THINLAYER,RESCR: HCPCS | Performed by: NURSE PRACTITIONER

## 2023-08-09 PROCEDURE — 87591 N.GONORRHOEAE DNA AMP PROB: CPT | Performed by: NURSE PRACTITIONER

## 2023-08-09 PROCEDURE — 87491 CHLMYD TRACH DNA AMP PROBE: CPT | Performed by: NURSE PRACTITIONER

## 2023-08-09 NOTE — PROGRESS NOTES
26yo  at 13w5d gestation. Complicated by BMI 30    PN labs normal  Has first MFM appt tomorrow. Feeling well, no complaints. Quickening absent. Denies LOF, VB or pain. S=D, normal FHTs, normal BP  Cervix long and closed    Pap/G/C collected today.   RV 4w

## 2023-08-10 ENCOUNTER — ROUTINE PRENATAL (OUTPATIENT)
Dept: PERINATAL CARE | Facility: CLINIC | Age: 28
End: 2023-08-10
Payer: COMMERCIAL

## 2023-08-10 VITALS
SYSTOLIC BLOOD PRESSURE: 114 MMHG | HEIGHT: 64 IN | DIASTOLIC BLOOD PRESSURE: 66 MMHG | HEART RATE: 84 BPM | WEIGHT: 185.4 LBS | BODY MASS INDEX: 31.65 KG/M2

## 2023-08-10 DIAGNOSIS — Z36.82 ENCOUNTER FOR NUCHAL TRANSLUCENCY TESTING: ICD-10-CM

## 2023-08-10 DIAGNOSIS — Z34.81 SUPERVISION OF NORMAL INTRAUTERINE PREGNANCY IN MULTIGRAVIDA, FIRST TRIMESTER: ICD-10-CM

## 2023-08-10 DIAGNOSIS — O36.80X0 ENCOUNTER TO DETERMINE FETAL VIABILITY OF PREGNANCY, SINGLE OR UNSPECIFIED FETUS: Primary | ICD-10-CM

## 2023-08-10 DIAGNOSIS — Z3A.13 13 WEEKS GESTATION OF PREGNANCY: ICD-10-CM

## 2023-08-10 PROCEDURE — 76813 OB US NUCHAL MEAS 1 GEST: CPT | Performed by: OBSTETRICS & GYNECOLOGY

## 2023-08-10 PROCEDURE — 76801 OB US < 14 WKS SINGLE FETUS: CPT | Performed by: OBSTETRICS & GYNECOLOGY

## 2023-08-10 NOTE — PROGRESS NOTES
Jomar García presents today for a genetic screening ultrasound. This is her third pregnancy. She has a history of a prior full-term vaginal delivery in 6276 without complications. She had 1 first trimester  1 year later. She has no other significant history other than postpartum depression which was well-managed and not currently an active issue. She did have kidney surgery for her ureters at the age of 10. She has a remote history of asthma not currently an active issue. Substance use history and family medical history are noncontributory. A review of systems is otherwise negative. We discussed the options for genetic screening, including but not limited to first trimester screening, second trimester screening, combined first and second trimester screening, noninvasive prenatal screening (NIPS) for patients at high risk and diagnostic screening through the use of CVS and amniocentesis. We discussed the risks and benefits of each approach including the sensitivities and false positive rates as well as the difference between a screening test and a diagnostic test. At the conclusion of our discussion the patient elected noninvasive prenatal testing utilizing the [facility] Non-invasive prenatal screening (NIPS) test. The patient had this blood work drawn in the office and the results should be available approximately 7-10 days after her blood draw. Her results will be reported from [place]. We discussed follow-up in detail and I recommend an anatomy ultrasound be scheduled for 20 weeks gestation. Thank you very much for allowing us to participate in the care of this very nice patient. Should you have any questions, please do not hesitate to contact me. Portions of the record may have been created with voice recognition software. Occasional wrong word or "sound a like" substitutions may have occurred due to the inherent limitations of voice recognition software.  Read the chart carefully and recognize, using context, where substitutions have occurred.

## 2023-08-10 NOTE — PROGRESS NOTES
Patient chose to have [facility] Non-invasive Prenatal Screen with fetal sex (per pt request). Patient given brochure and is aware [facility] will contact their insurance and coordinate coverage. Patient made aware she will need to respond to text message or e-mail from [address] within 2 business days or testing will be run through insurance. Patient informed text message will come from area code  "415". Provided [employer] # [phone] and web site : [email or web address].   "[place] your test online" card with barcode and test tube ID provided to patient. Reviewed [facility]'s web site states 5-7 business days for results via their portal.   [employer] message will be sent to patient when [facility] receives results /provider reviews. 2 vials of blood drawn from  left  arm by Sudeep Arias. Patient tolerated blood draw without difficulty. Specimens labeled with patient identifiers (name, date of birth, specimen collection date), order and specimen were verified with patient, packed and sent via [address]. [employer]  tracking #  [id number]  Copy of lab order scanned to Epic media. Maternal Fetal Medicine will have results in approximately 7-10 business days and will call patient or notify via [address] [place] [address]. Patient aware viewing lab result online will reveal fetal sex if ordered. Patient verbalized understanding of all instructions and no questions at this time.

## 2023-08-10 NOTE — LETTER
August 10, 2023     Luis Alberto Guajardo, [address] [address]    Patient: Vaibhav Mars   YOB: 1995   Date of Visit: 8/10/2023       Dear Dr. Rai Jarquin: Thank you for referring Sharan Randle to me for evaluation. Below are my notes for this consultation. If you have questions, please do not hesitate to call me. I look forward to following your patient along with you. Sincerely,        Tameka Quevedo MD        CC: No Recipients    Tameka Quevedo MD  8/10/2023  2:35 PM  Sign when Signing Visit  Young Boateng presents today for a genetic screening ultrasound. This is her third pregnancy. She has a history of a prior full-term vaginal delivery in 6231 without complications. She had 1 first trimester  1 year later. She has no other significant history other than postpartum depression which was well-managed and not currently an active issue. She did have kidney surgery for her ureters at the age of 10. She has a remote history of asthma not currently an active issue. Substance use history and family medical history are noncontributory. A review of systems is otherwise negative. We discussed the options for genetic screening, including but not limited to first trimester screening, second trimester screening, combined first and second trimester screening, noninvasive prenatal screening (NIPS) for patients at high risk and diagnostic screening through the use of CVS and amniocentesis.  We discussed the risks and benefits of each approach including the sensitivities and false positive rates as well as the difference between a screening test and a diagnostic test. At the conclusion of our discussion the patient elected noninvasive prenatal testing utilizing the [facility] Non-invasive prenatal screening (NIPS) test. The patient had this blood work drawn in the office and the results should be available approximately 7-10 days after her blood draw. Her results will be reported from [address] We discussed follow-up in detail and I recommend an anatomy ultrasound be scheduled for 20 weeks gestation. Thank you very much for allowing us to participate in the care of this very nice patient. Should you have any questions, please do not hesitate to contact me. Portions of the record may have been created with voice recognition software. Occasional wrong word or "sound a like" substitutions may have occurred due to the inherent limitations of voice recognition software. Read the chart carefully and recognize, using context, where substitutions have occurred. Leslye Kanner  8/10/2023  2:35 PM  Sign when Signing Visit  Patient chose to have [facility] Non-invasive Prenatal Screen with fetal sex (per pt request). Patient given brochure and is aware [facility] will contact their insurance and coordinate coverage. Patient made aware she will need to respond to text message or e-mail from [address] within 2 business days or testing will be run through insurance. Patient informed text message will come from area code  "415". Provided [facility] # [phone] and web site : [email or web address].   "[place] your test online" card with barcode and test tube ID provided to patient. Reviewed [employer]'s web site states 5-7 business days for results via their portal.   [employer] message will be sent to patient when [facility] receives results /provider reviews. 2 vials of blood drawn from  left  arm by Sudeep Arias. Patient tolerated blood draw without difficulty. Specimens labeled with patient identifiers (name, date of birth, specimen collection date), order and specimen were verified with patient, packed and sent via [address]. [employer] EX  tracking #  [id number]  Copy of lab order scanned to Epic media.     Maternal Fetal Medicine will have results in approximately 7-10 business days and will call patient or notify via [place]. Patient aware viewing lab result online will reveal fetal sex if ordered. Patient verbalized understanding of all instructions and no questions at this time.

## 2023-08-11 LAB
C TRACH DNA SPEC QL NAA+PROBE: NEGATIVE
N GONORRHOEA DNA SPEC QL NAA+PROBE: NEGATIVE

## 2023-08-15 LAB
LAB AP GYN PRIMARY INTERPRETATION: NORMAL
Lab: NORMAL
PATH INTERP SPEC-IMP: NORMAL

## 2023-08-31 PROBLEM — Z3A.16 16 WEEKS GESTATION OF PREGNANCY: Status: ACTIVE | Noted: 2023-08-10

## 2023-08-31 PROBLEM — Z97.5 IUD (INTRAUTERINE DEVICE) IN PLACE: Status: RESOLVED | Noted: 2022-11-22 | Resolved: 2023-08-31

## 2023-08-31 NOTE — PROGRESS NOTES
Assessment & Plan  33 y.o.  at 17w0d presenting for routine prenatal visit. Problem List        Other    Post partum depression    Mood disorder ([address])    Encounter for counseling regarding contraception    Possible exposure to STD    Encounter for IUD removal    Supervision of normal intrauterine pregnancy in multigravida, first trimester    17 weeks gestation of pregnancy    Overview     Prenatal labs wnl  NIPT wnl, patient declines MSAFP  Contraception: unsure, did not have good experiences with Mirena or Paragard        Other Visit Diagnoses     Encounter for pregnancy related examination in second trimester    -  Primary          ____________________________________________________________  Subjective  She is without complaint. She denies contractions, loss of fluid, or vaginal bleeding. She feels regular fetal movements. Objective  /62 (BP Location: Left arm, Patient Position: Sitting, Cuff Size: Standard)   Pulse 98   Ht 5' 4" (1.626 m)   Wt 85.6 kg (188 lb 12.8 oz)   LMP 2023 (Exact Date)   BMI 32.41 kg/m²   FHR: 145 bpm    Physical Exam  Constitutional:       Appearance: Normal appearance. HENT:      Head: Normocephalic and atraumatic. Cardiovascular:      Rate and Rhythm: Normal rate. Pulmonary:      Effort: Pulmonary effort is normal. No respiratory distress. Abdominal:      Palpations: Abdomen is soft. Tenderness: There is no abdominal tenderness. Musculoskeletal:         General: Normal range of motion. Neurological:      Mental Status: She is alert. Skin:     General: Skin is warm and dry.           Patient's Active Problem List  Patient Active Problem List   Diagnosis   • Post partum depression   • Mood disorder ([address])   • Encounter for counseling regarding contraception   • Possible exposure to STD   • Encounter for IUD removal   • Supervision of normal intrauterine pregnancy in multigravida, first trimester   • 17 weeks gestation of pregnancy Rico Marvin MD  9/1/2023  10:22 AM

## 2023-09-01 ENCOUNTER — ROUTINE PRENATAL (OUTPATIENT)
Dept: OBGYN CLINIC | Facility: CLINIC | Age: 28
End: 2023-09-01

## 2023-09-01 VITALS
WEIGHT: 188.8 LBS | HEART RATE: 98 BPM | BODY MASS INDEX: 32.23 KG/M2 | HEIGHT: 64 IN | SYSTOLIC BLOOD PRESSURE: 114 MMHG | DIASTOLIC BLOOD PRESSURE: 62 MMHG

## 2023-09-01 DIAGNOSIS — Z3A.17 17 WEEKS GESTATION OF PREGNANCY: ICD-10-CM

## 2023-09-01 DIAGNOSIS — Z34.92 ENCOUNTER FOR PREGNANCY RELATED EXAMINATION IN SECOND TRIMESTER: Primary | ICD-10-CM

## 2023-09-01 PROCEDURE — PNV: Performed by: OBSTETRICS & GYNECOLOGY

## 2023-09-25 ENCOUNTER — ROUTINE PRENATAL (OUTPATIENT)
Dept: PERINATAL CARE | Facility: CLINIC | Age: 28
End: 2023-09-25
Payer: COMMERCIAL

## 2023-09-25 VITALS
SYSTOLIC BLOOD PRESSURE: 120 MMHG | HEIGHT: 64 IN | HEART RATE: 88 BPM | DIASTOLIC BLOOD PRESSURE: 70 MMHG | WEIGHT: 188.8 LBS | BODY MASS INDEX: 32.23 KG/M2

## 2023-09-25 DIAGNOSIS — Z36.3 ENCOUNTER FOR ANTENATAL SCREENING FOR MALFORMATION: Primary | ICD-10-CM

## 2023-09-25 DIAGNOSIS — Z36.86 ENCOUNTER FOR ANTENATAL SCREENING FOR CERVICAL LENGTH: ICD-10-CM

## 2023-09-25 DIAGNOSIS — Z3A.20 20 WEEKS GESTATION OF PREGNANCY: ICD-10-CM

## 2023-09-25 PROCEDURE — 76805 OB US >/= 14 WKS SNGL FETUS: CPT | Performed by: OBSTETRICS & GYNECOLOGY

## 2023-09-25 PROCEDURE — 76817 TRANSVAGINAL US OBSTETRIC: CPT | Performed by: OBSTETRICS & GYNECOLOGY

## 2023-09-25 NOTE — LETTER
September 25, 2023     Alberto Bass MD  [address] [address]    Patient: Akanksha Carl   YOB: 1995   Date of Visit: 9/25/2023       Dear Dr. Rodrigue Zarate: Thank you for referring Rick Mahan to me for evaluation. Below are my notes for this consultation. If you have questions, please do not hesitate to call me. I look forward to following your patient along with you. Sincerely,        Aspen Bardales MD        CC: No Recipients    Aspen Bardales MD  9/25/2023  2:28 PM  Sign when Signing Visit  Akanksha Carl  has no complaints today at 20w3d. She reports fetal movements and does not report any vaginal bleeding or signs of labor. Her recently completed fetal testing revealed a normal NIPT. She is here today for an anatomy scan. Ultrasound findings: The ultrasound today shows normal interval fetal growth and fluid, normal cervical length, and no malformations were detected. Today's ultrasound was limited though secondary to fetal position so we could not clear all the cardiac anatomy. Pregnancy ultrasound has limitations and is unable to detect all forms of fetal congenital abnormalities. Follow up recommended:   1. Recommend a follow-up ultrasound for missed anatomy in 4 weeks.       Aspen Bardales MD

## 2023-09-25 NOTE — PROGRESS NOTES
Gene Francis  has no complaints today at 20w3d. She reports fetal movements and does not report any vaginal bleeding or signs of labor. Her recently completed fetal testing revealed a normal NIPT. She is here today for an anatomy scan. Ultrasound findings: The ultrasound today shows normal interval fetal growth and fluid, normal cervical length, and no malformations were detected. Today's ultrasound was limited though secondary to fetal position so we could not clear all the cardiac anatomy. Pregnancy ultrasound has limitations and is unable to detect all forms of fetal congenital abnormalities. Follow up recommended:   1. Recommend a follow-up ultrasound for missed anatomy in 4 weeks.       Matthew La MD

## 2023-09-25 NOTE — PROGRESS NOTES
Ultrasound Probe Disinfection    A transvaginal ultrasound was performed. Prior to use, disinfection was performed with [employer] ([employer]). Probe serial number [id number]: [id number] was used.       Familia Rodriguez  09/25/23  1:00 PM

## 2023-10-03 ENCOUNTER — ROUTINE PRENATAL (OUTPATIENT)
Dept: OBGYN CLINIC | Facility: CLINIC | Age: 28
End: 2023-10-03
Payer: COMMERCIAL

## 2023-10-03 VITALS
DIASTOLIC BLOOD PRESSURE: 62 MMHG | SYSTOLIC BLOOD PRESSURE: 100 MMHG | HEIGHT: 64 IN | BODY MASS INDEX: 32.44 KG/M2 | HEART RATE: 67 BPM | WEIGHT: 190 LBS

## 2023-10-03 DIAGNOSIS — Z34.92 ENCOUNTER FOR PREGNANCY RELATED EXAMINATION IN SECOND TRIMESTER: Primary | ICD-10-CM

## 2023-10-03 DIAGNOSIS — Z23 NEED FOR INFLUENZA VACCINATION: ICD-10-CM

## 2023-10-03 PROBLEM — Z3A.21 21 WEEKS GESTATION OF PREGNANCY: Status: ACTIVE | Noted: 2023-08-10

## 2023-10-03 PROCEDURE — PNV: Performed by: OBSTETRICS & GYNECOLOGY

## 2023-10-03 PROCEDURE — 90686 IIV4 VACC NO PRSV 0.5 ML IM: CPT | Performed by: OBSTETRICS & GYNECOLOGY

## 2023-10-03 PROCEDURE — 90471 IMMUNIZATION ADMIN: CPT | Performed by: OBSTETRICS & GYNECOLOGY

## 2023-10-03 NOTE — PROGRESS NOTES
Assessment & Plan  33 y.o.  at 21w4d presenting for routine prenatal visit. Problem List        Other    Mood disorder ([address])    21 weeks gestation of pregnancy    Overview     Prenatal labs wnl  NIPT wnl, patient declines MSAFP  Contraception: unsure, did not have good experiences with Mirena or Paragard   23: follow up missed anatomy in 4 weeks  Delivery consent [ ]  Flu vaccine [x]        Other Visit Diagnoses     Encounter for pregnancy related examination in second trimester    -  Primary          ____________________________________________________________  Subjective  She is without complaint. She denies contractions, loss of fluid, or vaginal bleeding. She feels regular fetal movements. Objective  /62 (BP Location: Left arm, Patient Position: Sitting, Cuff Size: Standard)   Pulse 67   Ht 5' 4" (1.626 m)   Wt 86.2 kg (190 lb)   LMP 2023 (Exact Date)   BMI 32.61 kg/m²   FHR: 140 bpm    Physical Exam  Constitutional:       Appearance: Normal appearance. HENT:      Head: Normocephalic and atraumatic. Cardiovascular:      Rate and Rhythm: Normal rate. Pulmonary:      Effort: Pulmonary effort is normal. No respiratory distress. Abdominal:      Palpations: Abdomen is soft. Tenderness: There is no abdominal tenderness. Musculoskeletal:         General: Normal range of motion. Neurological:      Mental Status: She is alert. Skin:     General: Skin is warm and dry.           Patient's Active Problem List  Patient Active Problem List   Diagnosis   • Mood disorder [facility])   • 21 weeks gestation of pregnancy           Luis Mendez MD  10/3/2023  12:02 PM

## 2023-10-22 NOTE — PROGRESS NOTES
Please refer to the [facility] ultrasound report in Ob Procedures for additional information regarding today's visit

## 2023-10-23 ENCOUNTER — ROUTINE PRENATAL (OUTPATIENT)
Dept: OBGYN CLINIC | Facility: CLINIC | Age: 28
End: 2023-10-23

## 2023-10-23 VITALS
BODY MASS INDEX: 32.74 KG/M2 | WEIGHT: 191.8 LBS | HEIGHT: 64 IN | SYSTOLIC BLOOD PRESSURE: 108 MMHG | DIASTOLIC BLOOD PRESSURE: 60 MMHG

## 2023-10-23 DIAGNOSIS — Z34.92 ENCOUNTER FOR SUPERVISION OF NORMAL PREGNANCY IN SECOND TRIMESTER, UNSPECIFIED GRAVIDITY: Primary | ICD-10-CM

## 2023-10-23 PROCEDURE — PNV: Performed by: OBSTETRICS & GYNECOLOGY

## 2023-10-23 NOTE — PROGRESS NOTES
Assessment & Plan  33 y.o.  at 24w3d presenting for routine prenatal visit. Problem List       Mood disorder ([address])    21 weeks gestation of pregnancy    Overview     Prenatal labs wnl  NIPT wnl, patient declines MSAFP  Contraception: unsure, did not have good experiences with Mirena or Paragard   23: follow up missed anatomy in 4 weeks  Delivery consent [ ]  Flu vaccine [x]  28 week labs ordered          Other Visit Diagnoses       Encounter for supervision of normal pregnancy in second trimester, unspecified     -  Primary              ____________________________________________________________  Subjective  She is without complaint. She denies contractions, loss of fluid, or vaginal bleeding. She feels regular fetal movements. Objective  /60 (BP Location: Right arm, Patient Position: Sitting, Cuff Size: Large)   Ht 5' 4" (1.626 m)   Wt 87 kg (191 lb 12.8 oz)   LMP 2023 (Exact Date)   BMI 32.92 kg/m²   FHR: 140's via doppler    Physical Exam  Constitutional:       Appearance: Normal appearance. Cardiovascular:      Rate and Rhythm: Normal rate. Pulmonary:      Effort: Pulmonary effort is normal.   Musculoskeletal:         General: No swelling or tenderness. Neurological:      Mental Status: She is alert and oriented to person, place, and time. Skin:     General: Skin is warm and dry. Vitals reviewed.           Patient's Active Problem List  Patient Active Problem List   Diagnosis    Mood disorder ([address])    21 weeks gestation of pregnancy           Maryse Perkins MD  10/23/2023  10:18 AM

## 2023-10-24 ENCOUNTER — ULTRASOUND (OUTPATIENT)
Age: 28
End: 2023-10-24
Payer: COMMERCIAL

## 2023-10-24 VITALS
DIASTOLIC BLOOD PRESSURE: 76 MMHG | HEART RATE: 73 BPM | BODY MASS INDEX: 33.43 KG/M2 | SYSTOLIC BLOOD PRESSURE: 118 MMHG | WEIGHT: 195.8 LBS | HEIGHT: 64 IN

## 2023-10-24 DIAGNOSIS — Z3A.24 24 WEEKS GESTATION OF PREGNANCY: ICD-10-CM

## 2023-10-24 DIAGNOSIS — Z36.89 ENCOUNTER FOR FETAL ANATOMIC SURVEY: Primary | ICD-10-CM

## 2023-10-24 PROCEDURE — 76816 OB US FOLLOW-UP PER FETUS: CPT | Performed by: OBSTETRICS & GYNECOLOGY

## 2023-10-24 NOTE — LETTER
October 24, 2023     Diego Vance, [facility]    Patient: Latha Garcias   YOB: 1995   Date of Visit: 10/24/2023       Dear Dr. Ryan Lara: Thank you for referring Chris Nascimento to me for evaluation. Below are my notes for this consultation. If you have questions, please do not hesitate to call me. I look forward to following your patient along with you.          Sincerely,        Raad Keith MD        CC: No Recipients    Raad Keith MD  10/24/2023  9:27 AM  Sign when Signing Visit  Please refer to the [facility] ultrasound report in Ob Procedures for additional information regarding today's visit

## 2023-11-20 ENCOUNTER — ROUTINE PRENATAL (OUTPATIENT)
Dept: OBGYN CLINIC | Facility: CLINIC | Age: 28
End: 2023-11-20

## 2023-11-20 VITALS
HEIGHT: 64 IN | BODY MASS INDEX: 33.39 KG/M2 | DIASTOLIC BLOOD PRESSURE: 74 MMHG | SYSTOLIC BLOOD PRESSURE: 122 MMHG | WEIGHT: 195.6 LBS

## 2023-11-20 DIAGNOSIS — Z34.83 SUPERVISION OF NORMAL INTRAUTERINE PREGNANCY IN MULTIGRAVIDA, THIRD TRIMESTER: Primary | ICD-10-CM

## 2023-11-20 PROBLEM — Z3A.28 28 WEEKS GESTATION OF PREGNANCY: Status: ACTIVE | Noted: 2023-08-10

## 2023-11-20 PROCEDURE — PNV: Performed by: OBSTETRICS & GYNECOLOGY

## 2023-11-20 NOTE — PROGRESS NOTES
Assessment & Plan  33 y.o.  at 28w3d presenting for routine prenatal visit. Problem List       Mood disorder ([address])    28 weeks gestation of pregnancy    Overview     Prenatal labs wnl  NIPT wnl, patient declines MSAFP  Contraception: unsure, did not have good experiences with Mirena or Paragard   23: follow up missed anatomy in 4 weeks  Delivery consent [ ]  Flu vaccine [x]  28 week labs ordered          ____________________________________________________________  Subjective  She is without complaint. She denies contractions, loss of fluid, or vaginal bleeding. She feels regular fetal movements. Objective  LMP 2023 (Exact Date)   FHR: 140's via doppler    Physical Exam  Constitutional:       Appearance: She is well-developed. Cardiovascular:      Rate and Rhythm: Normal rate and regular rhythm. Heart sounds: Normal heart sounds. No murmur heard. No friction rub. No gallop. Pulmonary:      Effort: Pulmonary effort is normal. No respiratory distress. Breath sounds: No wheezing. Abdominal:      Palpations: Abdomen is soft. Tenderness: There is no abdominal tenderness. Musculoskeletal:         General: No tenderness. Neurological:      Mental Status: She is alert and oriented to person, place, and time. Vitals reviewed.           Patient's Active Problem List  Patient Active Problem List   Diagnosis    Mood disorder ([address])    28 weeks gestation of pregnancy           Nelly Burton MD  2023  9:37 AM

## 2023-11-24 ENCOUNTER — HOSPITAL ENCOUNTER (EMERGENCY)
Facility: HOSPITAL | Age: 28
Discharge: HOME | End: 2023-11-24
Attending: EMERGENCY MEDICINE
Payer: COMMERCIAL

## 2023-11-24 VITALS
DIASTOLIC BLOOD PRESSURE: 52 MMHG | TEMPERATURE: 98.6 F | SYSTOLIC BLOOD PRESSURE: 103 MMHG | OXYGEN SATURATION: 97 % | HEART RATE: 81 BPM | RESPIRATION RATE: 14 BRPM

## 2023-11-24 DIAGNOSIS — R51.9 HEADACHE: Primary | ICD-10-CM

## 2023-11-24 DIAGNOSIS — B97.89 ACUTE VIRAL SINUSITIS: ICD-10-CM

## 2023-11-24 DIAGNOSIS — J01.90 ACUTE VIRAL SINUSITIS: ICD-10-CM

## 2023-11-24 LAB
ALBUMIN SERPL BCP-MCNC: 3.5 G/DL (ref 3.5–5)
ALP SERPL-CCNC: 82 U/L (ref 34–104)
ALT SERPL W P-5'-P-CCNC: 6 U/L (ref 7–52)
ANION GAP SERPL CALCULATED.3IONS-SCNC: 7 MMOL/L
AST SERPL W P-5'-P-CCNC: 10 U/L (ref 13–39)
BASOPHILS # BLD AUTO: 0.04 THOUSANDS/ÂΜL (ref 0–0.1)
BASOPHILS NFR BLD AUTO: 0 % (ref 0–1)
BILIRUB SERPL-MCNC: 0.28 MG/DL (ref 0.2–1)
BUN SERPL-MCNC: 7 MG/DL (ref 5–25)
CALCIUM SERPL-MCNC: 8.7 MG/DL (ref 8.4–10.2)
CHLORIDE SERPL-SCNC: 104 MMOL/L (ref 96–108)
CO2 SERPL-SCNC: 25 MMOL/L (ref 21–32)
CREAT SERPL-MCNC: 0.58 MG/DL (ref 0.6–1.3)
EOSINOPHIL # BLD AUTO: 0.1 THOUSAND/ÂΜL (ref 0–0.61)
EOSINOPHIL NFR BLD AUTO: 1 % (ref 0–6)
ERYTHROCYTE [DISTWIDTH] IN BLOOD BY AUTOMATED COUNT: 12.6 % (ref 11.6–15.1)
FLUAV RNA RESP QL NAA+PROBE: NEGATIVE
FLUBV RNA RESP QL NAA+PROBE: NEGATIVE
GFR SERPL CREATININE-BSD FRML MDRD: 126 ML/MIN/1.73SQ M
GLUCOSE SERPL-MCNC: 98 MG/DL (ref 65–140)
HCT VFR BLD AUTO: 35.6 % (ref 34.8–46.1)
HGB BLD-MCNC: 11.9 G/DL (ref 11.5–15.4)
IMM GRANULOCYTES # BLD AUTO: 0.12 THOUSAND/UL (ref 0–0.2)
IMM GRANULOCYTES NFR BLD AUTO: 1 % (ref 0–2)
LYMPHOCYTES # BLD AUTO: 1.83 THOUSANDS/ÂΜL (ref 0.6–4.47)
LYMPHOCYTES NFR BLD AUTO: 17 % (ref 14–44)
MCH RBC QN AUTO: 27.7 PG (ref 26.8–34.3)
MCHC RBC AUTO-ENTMCNC: 33.4 G/DL (ref 31.4–37.4)
MCV RBC AUTO: 83 FL (ref 82–98)
MONOCYTES # BLD AUTO: 0.72 THOUSAND/ÂΜL (ref 0.17–1.22)
MONOCYTES NFR BLD AUTO: 7 % (ref 4–12)
NEUTROPHILS # BLD AUTO: 8.14 THOUSANDS/ÂΜL (ref 1.85–7.62)
NEUTS SEG NFR BLD AUTO: 74 % (ref 43–75)
NRBC BLD AUTO-RTO: 0 /100 WBCS
PLATELET # BLD AUTO: 241 THOUSANDS/UL (ref 149–390)
PMV BLD AUTO: 10.3 FL (ref 8.9–12.7)
POTASSIUM SERPL-SCNC: 3.6 MMOL/L (ref 3.5–5.3)
PROT SERPL-MCNC: 6.6 G/DL (ref 6.4–8.4)
RBC # BLD AUTO: 4.29 MILLION/UL (ref 3.81–5.12)
RSV RNA RESP QL NAA+PROBE: NEGATIVE
SARS-COV-2 RNA RESP QL NAA+PROBE: NEGATIVE
SODIUM SERPL-SCNC: 136 MMOL/L (ref 135–147)
WBC # BLD AUTO: 10.95 THOUSAND/UL (ref 4.31–10.16)

## 2023-11-24 PROCEDURE — 0241U HB NFCT DS VIR RESP RNA 4 TRGT: CPT | Performed by: EMERGENCY MEDICINE

## 2023-11-24 PROCEDURE — 85025 COMPLETE CBC W/AUTO DIFF WBC: CPT | Performed by: EMERGENCY MEDICINE

## 2023-11-24 PROCEDURE — 99284 EMERGENCY DEPT VISIT MOD MDM: CPT | Performed by: EMERGENCY MEDICINE

## 2023-11-24 PROCEDURE — 99284 EMERGENCY DEPT VISIT MOD MDM: CPT

## 2023-11-24 PROCEDURE — 96375 TX/PRO/DX INJ NEW DRUG ADDON: CPT

## 2023-11-24 PROCEDURE — 96365 THER/PROPH/DIAG IV INF INIT: CPT

## 2023-11-24 PROCEDURE — 80053 COMPREHEN METABOLIC PANEL: CPT | Performed by: EMERGENCY MEDICINE

## 2023-11-24 PROCEDURE — 36415 COLL VENOUS BLD VENIPUNCTURE: CPT | Performed by: EMERGENCY MEDICINE

## 2023-11-24 RX ORDER — DIPHENHYDRAMINE HYDROCHLORIDE 50 MG/ML
25 INJECTION INTRAMUSCULAR; INTRAVENOUS ONCE
Status: COMPLETED | OUTPATIENT
Start: 2023-11-24 | End: 2023-11-24

## 2023-11-24 RX ORDER — ACETAMINOPHEN 325 MG/1
650 TABLET ORAL ONCE
Status: COMPLETED | OUTPATIENT
Start: 2023-11-24 | End: 2023-11-24

## 2023-11-24 RX ORDER — METOCLOPRAMIDE HYDROCHLORIDE 5 MG/ML
10 INJECTION INTRAMUSCULAR; INTRAVENOUS ONCE
Status: COMPLETED | OUTPATIENT
Start: 2023-11-24 | End: 2023-11-24

## 2023-11-24 RX ORDER — MAGNESIUM SULFATE HEPTAHYDRATE 40 MG/ML
2 INJECTION, SOLUTION INTRAVENOUS ONCE
Status: COMPLETED | OUTPATIENT
Start: 2023-11-24 | End: 2023-11-24

## 2023-11-24 RX ADMIN — METOCLOPRAMIDE 10 MG: 5 INJECTION, SOLUTION INTRAMUSCULAR; INTRAVENOUS at 17:26

## 2023-11-24 RX ADMIN — MAGNESIUM SULFATE HEPTAHYDRATE 2 G: 40 INJECTION, SOLUTION INTRAVENOUS at 17:29

## 2023-11-24 RX ADMIN — ACETAMINOPHEN 650 MG: 325 TABLET, FILM COATED ORAL at 17:17

## 2023-11-24 RX ADMIN — SODIUM CHLORIDE 1000 ML: 0.9 INJECTION, SOLUTION INTRAVENOUS at 17:23

## 2023-11-24 RX ADMIN — DIPHENHYDRAMINE HYDROCHLORIDE 25 MG: 50 INJECTION, SOLUTION INTRAMUSCULAR; INTRAVENOUS at 17:25

## 2023-11-24 NOTE — ED PROVIDER NOTES
History  Chief Complaint   Patient presents with    Headache     Pt reports she has had a migraine for 7 hours no hx or formal dx of a migraine. Pt is 29 weeks preg. Took 1 regular strength tylenol around 1130 today. RAJEEV Hewitt is a 20-year-old female G2, P1, currently 29 weeks pregnant, presenting to the emergency department with a frontal nonradiating constant sharp pressure-like headache along her forehead that started 7 hours prior to arrival in the emergency department. She has never had pain like this before and is not prone to migraines. She states that over the past 1 to 2 weeks she has had/nasal congestion. Today she awoke and started to develop this headache out of nowhere. She drank coffee, water, took 325 mg of Tylenol around 1130 today, attempted to take a nap. When she awoke from her nap and she noted that the headache was still present long with some nausea. Headache is worse with bending forward and what sensitive to light. Otherwise, she denies recent fever, visual changes, neck pain, chest pain, shortness of breath, palpitations, vomiting, abdominal pain, urinary symptoms, changes in stool, leg pain or leg swelling, rash. Has 1 sick contact: Her child with a cough/URI symptoms. No recent travel. Prior to Admission Medications   Prescriptions Last Dose Informant Patient Reported? Taking?    Prenatal Vit-Fe Fumarate-FA (PRENATAL VITAMIN PO)   Yes No   Sig: Take by mouth      Facility-Administered Medications: None       Past Medical History:   Diagnosis Date    Asthma 2013    Extrinsic asthma    Oral herpes     Post partum depression 01/23/2020    Varicella        Past Surgical History:   Procedure Laterality Date    KIDNEY SURGERY Bilateral     bilateral ureter reimplantation, age 10    REMOVAL OF INTRAUTERINE DEVICE (IUD)  10/08/2020    Mirena IUD removed       Family History   Problem Relation Age of Onset    No Known Problems Mother     Asthma Father     Bronchiolitis Father Depression Father     Heart defect Sister     Supraventricular tachycardia Sister         cathecholaminergic polymorphic    No Known Problems Brother     No Known Problems Daughter     Breast cancer Maternal Grandmother         <50    Diabetes Maternal Grandmother     Thyroid disease Maternal Grandmother         Not Shweta, this is my maternal grandmother on my dad’s side    No Known Problems Maternal Grandfather     Thyroid disease Paternal Grandmother     Prostate cancer Paternal Grandfather     Alcohol abuse Paternal Grandfather     Cancer Paternal Grandfather         Prostate/lung    Thyroid disease Paternal Aunt     Hypertension Family     Ovarian cancer Neg Hx     Uterine cancer Neg Hx     Colon cancer Neg Hx      I have reviewed and agree with the history as documented. E-Cigarette/Vaping    E-Cigarette Use Never User      E-Cigarette/Vaping Substances    Nicotine No     THC No     CBD No     Flavoring No     Other No     Unknown No      Social History     Tobacco Use    Smoking status: Never    Smokeless tobacco: Never   Vaping Use    Vaping Use: Never used   Substance Use Topics    Alcohol use: Not Currently     Alcohol/week: 3.0 standard drinks of alcohol     Types: 3 Standard drinks or equivalent per week    Drug use: Never       Review of Systems   Constitutional:  Negative for chills and fever. Eyes:  Positive for photophobia. Respiratory:  Negative for apnea, cough, choking, chest tightness, shortness of breath, wheezing and stridor. Cardiovascular:  Negative for chest pain and leg swelling. Gastrointestinal:  Positive for nausea. Negative for abdominal distention, abdominal pain, constipation, diarrhea, rectal pain and vomiting. Genitourinary:  Negative for dysuria and hematuria. Musculoskeletal:  Negative for back pain, gait problem and neck pain. Skin:  Negative for color change, pallor, rash and wound. Neurological:  Positive for headaches.  Negative for dizziness, tremors, seizures, syncope, facial asymmetry, speech difficulty, weakness, light-headedness and numbness. Physical Exam  Physical Exam  Vitals and nursing note reviewed. Constitutional:       General: She is not in acute distress. Appearance: Normal appearance. She is well-developed. She is not ill-appearing, toxic-appearing or diaphoretic. HENT:      Head: Normocephalic and atraumatic. Right Ear: External ear normal.      Left Ear: External ear normal.      Nose: Nose normal.      Mouth/Throat:      Mouth: Mucous membranes are moist.   Eyes:      General:         Right eye: No discharge. Left eye: No discharge. Extraocular Movements: Extraocular movements intact. Conjunctiva/sclera: Conjunctivae normal.      Pupils: Pupils are equal, round, and reactive to light. Cardiovascular:      Rate and Rhythm: Normal rate and regular rhythm. Heart sounds: Normal heart sounds. No murmur heard. No friction rub. No gallop. Pulmonary:      Effort: Pulmonary effort is normal. No respiratory distress. Breath sounds: Normal breath sounds. No stridor. No wheezing or rales. Chest:      Chest wall: No tenderness. Abdominal:      General: Bowel sounds are normal. There is no distension. Palpations: Abdomen is soft. There is no mass. Tenderness: There is no abdominal tenderness. There is no guarding or rebound. Hernia: No hernia is present. Musculoskeletal:         General: No tenderness or deformity. Normal range of motion. Cervical back: Normal range of motion and neck supple. Right lower leg: No edema. Left lower leg: No edema. Skin:     General: Skin is warm and dry. Capillary Refill: Capillary refill takes less than 2 seconds. Neurological:      General: No focal deficit present. Mental Status: She is alert and oriented to person, place, and time. GCS: GCS eye subscore is 4. GCS verbal subscore is 5. GCS motor subscore is 6. Cranial Nerves: Cranial nerves 2-12 are intact. No dysarthria or facial asymmetry. Motor: No weakness, tremor, abnormal muscle tone or pronator drift. Coordination: Coordination is intact. Psychiatric:         Attention and Perception: Attention normal.         Mood and Affect: Mood normal.         Vital Signs  ED Triage Vitals   Temperature Pulse Respirations Blood Pressure SpO2   11/24/23 1605 11/24/23 1605 11/24/23 1605 11/24/23 1605 11/24/23 1605   98.6 °F (37 °C) 83 18 141/77 98 %      Temp Source Heart Rate Source Patient Position - Orthostatic VS BP Location FiO2 (%)   11/24/23 1605 11/24/23 1730 11/24/23 1605 11/24/23 1605 --   Oral Monitor Sitting Right arm       Pain Score       --                  Vitals:    11/24/23 1605 11/24/23 1730   BP: 141/77 114/62   Pulse: 83 81   Patient Position - Orthostatic VS: Sitting Lying         Visual Acuity      ED Medications  Medications   magnesium sulfate 2 g/50 mL IVPB (premix) 2 g (2 g Intravenous New Bag 11/24/23 1729)   sodium chloride 0.9 % bolus 1,000 mL (1,000 mL Intravenous New Bag 11/24/23 1723)   metoclopramide (REGLAN) injection 10 mg (10 mg Intravenous Given 11/24/23 1726)   diphenhydrAMINE (BENADRYL) injection 25 mg (25 mg Intravenous Given 11/24/23 1725)   acetaminophen (TYLENOL) tablet 650 mg (650 mg Oral Given 11/24/23 1717)       Diagnostic Studies  Results Reviewed       Procedure Component Value Units Date/Time    FLU/RSV/COVID - if FLU/RSV clinically relevant [[id number]]  (Normal) Collected: 11/24/23 1721    Lab Status: Final result Specimen: Nares from Nose Updated: 11/24/23 1805     SARS-CoV-2 Negative     INFLUENZA A PCR Negative     INFLUENZA B PCR Negative     RSV PCR Negative    Narrative:      FOR PEDIATRIC PATIENTS - copy/paste COVID Guidelines URL to browser: [email or web address]    SARS-CoV-2 assay is a Nucleic Acid Amplification assay intended for the  qualitative detection of nucleic acid from SARS-CoV-2 in nasopharyngeal  swabs. Results are for the presumptive identification of SARS-CoV-2 RNA. Positive results are indicative of infection with SARS-CoV-2, the virus  causing COVID-19, but do not rule out bacterial infection or co-infection  with other viruses. Laboratories within the [facility] and its  territories are required to report all positive results to the appropriate  public health authorities. Negative results do not preclude SARS-CoV-2  infection and should not be used as the sole basis for treatment or other  patient management decisions. Negative results must be combined with  clinical observations, patient history, and epidemiological information. This test has not been FDA cleared or approved. This test has been authorized by FDA under an Emergency Use Authorization  (EUA). This test is only authorized for the duration of time the  declaration that circumstances exist justifying the authorization of the  emergency use of an in vitro diagnostic tests for detection of SARS-CoV-2  virus and/or diagnosis of COVID-19 infection under section 564(b)(1) of  the Act, 21 U. S.C. 167GJT-6(Z)(8), unless the authorization is terminated  or revoked sooner. The test has been validated but independent review by FDA  and CLIA is pending. Test performed using [employer] Gene[employer]: This RT-PCR assay targets N2,  a region unique to SARS-CoV-2. A conserved region in the E-gene was chosen  for pan-Sarbecovirus detection which includes SARS-CoV-2. According to CMS-2020-01-R, this platform meets the definition of high-throughput technology.     Comprehensive metabolic panel [[id number]]  (Abnormal) Collected: 11/24/23 1721    Lab Status: Final result Specimen: Blood from Arm, Left Updated: 11/24/23 1746     Sodium 136 mmol/L      Potassium 3.6 mmol/L      Chloride 104 mmol/L      CO2 25 mmol/L      ANION GAP 7 mmol/L      BUN 7 mg/dL      Creatinine 0.58 mg/dL      Glucose 98 mg/dL      Calcium 8.7 mg/dL      AST 10 U/L      ALT 6 U/L      Alkaline Phosphatase 82 U/L      Total Protein 6.6 g/dL      Albumin 3.5 g/dL      Total Bilirubin 0.28 mg/dL      eGFR 126 ml/min/1.73sq m     Narrative:      [place] guidelines for Chronic Kidney Disease (CKD):     Stage 1 with normal or high GFR (GFR > 90 mL/min/1.73 square meters)    Stage 2 Mild CKD (GFR = 60-89 mL/min/1.73 square meters)    Stage 3A Moderate CKD (GFR = 45-59 mL/min/1.73 square meters)    Stage 3B Moderate CKD (GFR = 30-44 mL/min/1.73 square meters)    Stage 4 Severe CKD (GFR = 15-29 mL/min/1.73 square meters)    Stage 5 End Stage CKD (GFR <15 mL/min/1.73 square meters)  Note: GFR calculation is accurate only with a steady state creatinine    CBC and differential [[id number]]  (Abnormal) Collected: 11/24/23 1721    Lab Status: Final result Specimen: Blood from Arm, Left Updated: 11/24/23 1728     WBC 10.95 Thousand/uL      RBC 4.29 Million/uL      Hemoglobin 11.9 g/dL      Hematocrit 35.6 %      MCV 83 fL      MCH 27.7 pg      MCHC 33.4 g/dL      RDW 12.6 %      MPV 10.3 fL      Platelets 463 Thousands/uL      nRBC 0 /100 WBCs      Neutrophils Relative 74 %      Immat GRANS % 1 %      Lymphocytes Relative 17 %      Monocytes Relative 7 %      Eosinophils Relative 1 %      Basophils Relative 0 %      Neutrophils Absolute 8.14 Thousands/µL      Immature Grans Absolute 0.12 Thousand/uL      Lymphocytes Absolute 1.83 Thousands/µL      Monocytes Absolute 0.72 Thousand/µL      Eosinophils Absolute 0.10 Thousand/µL      Basophils Absolute 0.04 Thousands/µL                    No orders to display              Procedures  Procedures         ED Course  ED Course as of 11/24/23 1824 Fri Nov 24, 2023   160 67-year-old female presenting to the emergency department with acute onset of frontal nonradiating painful constant headache since earlier today that came on all of a sudden in the setting of recent nasal congestion and URI symptoms. Her vital signs are within normal limits. Physical exam is remarkable for a slightly uncomfortable appearing young woman, notably gravid, with no focal deficits, no nuchal rigidity, heart and lungs within normal limits, abdomen remarkable. Her clinical presentation last history puts her at risk for the following differential diagnoses: Sinusitis, migraine, cortical sinus thrombosis, intracranial bleed, viral syndrome. Lab work was ordered. Imaging deferred at this time. Will reevaluate after administration of medications given that patient has no focal deficits and otherwise appears to be mostly well. 1726 1 L fluid bolus started. 650 mg acetaminophen given. 25 mg IV Benadryl, 10 mg IV gland, 2 g IV magnesium ordered for symptom control. Lights turned off. Patient asked for water. On reevaluation, patient is upright, talking on the phone, appears comfortable. 1812 FLU/RSV/COVID - if FLU/RSV clinically relevant  Within normal limits   1812 WBC(!): 10.95  Very slight leukocytosis   1812 CBC, CMP grossly unremarkable. 1821 On reassessment, patient feels fully improved. Given patient's history of nasal congestion, suspect a viral sinusitis. 1824 Upon re-assessment, patient feels improved. Patient remained hemodynamically and clinically stable in the ED. Strict return precautions given. Patient verbalized understanding and will follow up as outpatient with PMD.  Upon discharge, patient was Gerda Sand, and fully ambulatory. Medical Decision Making  Amount and/or Complexity of Data Reviewed  Labs: ordered. Decision-making details documented in ED Course. Risk  OTC drugs. Prescription drug management.              Disposition  Final diagnoses:   None     ED Disposition       None          Follow-up Information    None         Patient's Medications   Discharge Prescriptions    No medications on file       No discharge procedures on file.     PDMP Review       None            ED Provider  Electronically Signed by             Ronit Mcnair MD  11/24/23 7401

## 2023-11-24 NOTE — DISCHARGE INSTRUCTIONS
If you continue to have sinus pressure and headache past 2 weeks and additionally begin develop purulent drainage, I would recommend following up with your primary care physician as this may be signs of a superimposed bacterial infection and may need antibiotics. At this time, you can continue taking antihistamines such as cetirizine, diphenhydramine to help with some of your symptoms. I would also increase the amount of acetaminophen/Tylenol that you take when you do have a headache. You can take up to 1 g of Tylenol every 6-8 hours for a max total of 6 g in a 24-hour period. Otherwise, if you develop visual changes, neck pain, worsening headache, seizure, confusion, fever, difficulty breathing, or any other concerning symptoms, please return to the emergency department.

## 2023-11-30 ENCOUNTER — APPOINTMENT (OUTPATIENT)
Dept: LAB | Age: 28
End: 2023-11-30
Payer: COMMERCIAL

## 2023-11-30 DIAGNOSIS — Z34.81 SUPERVISION OF NORMAL INTRAUTERINE PREGNANCY IN MULTIGRAVIDA, FIRST TRIMESTER: ICD-10-CM

## 2023-11-30 DIAGNOSIS — Z34.92 ENCOUNTER FOR SUPERVISION OF NORMAL PREGNANCY IN SECOND TRIMESTER, UNSPECIFIED GRAVIDITY: ICD-10-CM

## 2023-11-30 PROCEDURE — 82950 GLUCOSE TEST: CPT

## 2023-11-30 PROCEDURE — 36415 COLL VENOUS BLD VENIPUNCTURE: CPT

## 2023-12-01 LAB — GLUCOSE 1H P 50 G GLC PO SERPL-MCNC: 92 MG/DL (ref 40–134)

## 2023-12-04 ENCOUNTER — OFFICE VISIT (OUTPATIENT)
Dept: OBGYN CLINIC | Facility: CLINIC | Age: 28
End: 2023-12-04

## 2023-12-04 VITALS
SYSTOLIC BLOOD PRESSURE: 112 MMHG | WEIGHT: 196.6 LBS | DIASTOLIC BLOOD PRESSURE: 64 MMHG | HEIGHT: 64 IN | BODY MASS INDEX: 33.57 KG/M2 | HEART RATE: 80 BPM

## 2023-12-04 DIAGNOSIS — Z34.93 ENCOUNTER FOR PREGNANCY RELATED EXAMINATION, THIRD TRIMESTER: Primary | ICD-10-CM

## 2023-12-04 DIAGNOSIS — J01.10 ACUTE FRONTAL SINUSITIS, RECURRENCE NOT SPECIFIED: ICD-10-CM

## 2023-12-04 PROBLEM — Z3A.30 30 WEEKS GESTATION OF PREGNANCY: Status: ACTIVE | Noted: 2023-08-10

## 2023-12-04 PROCEDURE — PNV: Performed by: OBSTETRICS & GYNECOLOGY

## 2023-12-04 RX ORDER — AMOXICILLIN 500 MG/1
500 CAPSULE ORAL EVERY 8 HOURS SCHEDULED
Qty: 21 CAPSULE | Refills: 0 | Status: SHIPPED | OUTPATIENT
Start: 2023-12-04 | End: 2023-12-11

## 2023-12-04 NOTE — PROGRESS NOTES
Assessment & Plan  33 y.o.  at 30w3d presenting for routine prenatal visit. - Patient denies any other preeclampsia symptoms, normotensive  - Recommended evaluation of sinuses in Urgent Care or with PCP  - Amoxicillin prescribed for empiric treatment    Problem List       Mood disorder ([address])    30 weeks gestation of pregnancy    Overview     Prenatal labs wnl  NIPT wnl, patient declines MSAFP  Contraception: unsure, did not have good experiences with Mirena or Paragard   23: follow up missed anatomy in 4 weeks  Delivery consent [ ]  Flu vaccine [x]  28 week labs ordered          ____________________________________________________________  Subjective  She reports symptoms of sinusitis, including a sinus headache, since , which has not resolved with tylenol, zyrtec, and flonase. She denies contractions, loss of fluid, or vaginal bleeding. She feels regular fetal movements. Objective  /64 (BP Location: Right arm, Patient Position: Sitting, Cuff Size: Standard)   Pulse 80   Ht 5' 4" (1.626 m)   Wt 89.2 kg (196 lb 9.6 oz)   LMP 2023 (Exact Date)   BMI 33.75 kg/m²   FHR: 140 bpm    Physical Exam  Constitutional:       General: She is not in acute distress. Appearance: Normal appearance. She is not ill-appearing. HENT:      Head: Normocephalic and atraumatic. Cardiovascular:      Rate and Rhythm: Normal rate. Pulmonary:      Effort: Pulmonary effort is normal. No respiratory distress. Abdominal:      Palpations: Abdomen is soft. Tenderness: There is no abdominal tenderness. Musculoskeletal:         General: Normal range of motion. Neurological:      Mental Status: She is alert. Skin:     General: Skin is warm and dry.           Patient's Active Problem List  Patient Active Problem List   Diagnosis    Mood disorder ([address])    30 weeks gestation of pregnancy           Stephon Barton MD  2023  2:48 PM

## 2023-12-15 ENCOUNTER — ROUTINE PRENATAL (OUTPATIENT)
Dept: OBGYN CLINIC | Facility: CLINIC | Age: 28
End: 2023-12-15
Payer: COMMERCIAL

## 2023-12-15 VITALS
HEIGHT: 64 IN | DIASTOLIC BLOOD PRESSURE: 64 MMHG | SYSTOLIC BLOOD PRESSURE: 118 MMHG | HEART RATE: 71 BPM | BODY MASS INDEX: 33.8 KG/M2 | WEIGHT: 198 LBS

## 2023-12-15 DIAGNOSIS — Z23 NEED FOR TDAP VACCINATION: ICD-10-CM

## 2023-12-15 DIAGNOSIS — Z3A.32 32 WEEKS GESTATION OF PREGNANCY: ICD-10-CM

## 2023-12-15 DIAGNOSIS — O99.213 MATERNAL OBESITY, ANTEPARTUM, THIRD TRIMESTER: Primary | ICD-10-CM

## 2023-12-15 PROBLEM — F39 MOOD DISORDER (HCC): Status: RESOLVED | Noted: 2022-01-05 | Resolved: 2023-12-15

## 2023-12-15 PROCEDURE — PNV: Performed by: OBSTETRICS & GYNECOLOGY

## 2023-12-15 PROCEDURE — 90471 IMMUNIZATION ADMIN: CPT

## 2023-12-15 PROCEDURE — 90715 TDAP VACCINE 7 YRS/> IM: CPT

## 2023-12-15 NOTE — PROGRESS NOTES
Pt is a 33 y.o.  32w0d  Pregnancy is complicated by Obesity    Pt reports +FM. Denies vb, lof, ctx.  PTL precautions and fkc reviewed    TDAP administered today    3d trimester folder reviewed and signed  Delivery consent reviewed and signed    Pt desires elective IOL when I am on call 2024

## 2023-12-29 ENCOUNTER — ROUTINE PRENATAL (OUTPATIENT)
Dept: OBGYN CLINIC | Facility: CLINIC | Age: 28
End: 2023-12-29

## 2023-12-29 ENCOUNTER — APPOINTMENT (OUTPATIENT)
Dept: LAB | Facility: CLINIC | Age: 28
End: 2023-12-29
Payer: COMMERCIAL

## 2023-12-29 VITALS
BODY MASS INDEX: 34.11 KG/M2 | HEIGHT: 64 IN | DIASTOLIC BLOOD PRESSURE: 74 MMHG | SYSTOLIC BLOOD PRESSURE: 118 MMHG | WEIGHT: 199.8 LBS

## 2023-12-29 DIAGNOSIS — O99.213 MATERNAL OBESITY, ANTEPARTUM, THIRD TRIMESTER: Primary | ICD-10-CM

## 2023-12-29 PROCEDURE — PNV: Performed by: NURSE PRACTITIONER

## 2023-12-29 PROCEDURE — 86780 TREPONEMA PALLIDUM: CPT

## 2023-12-29 NOTE — PROGRESS NOTES
27 yo  at 34w gestation.    28w GTT normal; RPR was not done.  She will have this drawn today.    Baby is active, denies leakage of fluid, vaginal bleeding or uterine contractions.  S=D, normal FHTs, normal BP    Desires IOL with Dr. Meredith on 24  RV 2w-- GBS

## 2024-01-01 LAB — TREPONEMA PALLIDUM IGG+IGM AB [PRESENCE] IN SERUM OR PLASMA BY IMMUNOASSAY: NORMAL

## 2024-01-15 ENCOUNTER — ROUTINE PRENATAL (OUTPATIENT)
Dept: OBGYN CLINIC | Facility: CLINIC | Age: 29
End: 2024-01-15

## 2024-01-15 VITALS
DIASTOLIC BLOOD PRESSURE: 72 MMHG | HEIGHT: 64 IN | HEART RATE: 72 BPM | WEIGHT: 198.6 LBS | SYSTOLIC BLOOD PRESSURE: 118 MMHG | BODY MASS INDEX: 33.9 KG/M2

## 2024-01-15 DIAGNOSIS — Z34.93 ENCOUNTER FOR PREGNANCY RELATED EXAMINATION, THIRD TRIMESTER: Primary | ICD-10-CM

## 2024-01-15 DIAGNOSIS — O99.213 MATERNAL OBESITY, ANTEPARTUM, THIRD TRIMESTER: ICD-10-CM

## 2024-01-15 DIAGNOSIS — Z3A.36 36 WEEKS GESTATION OF PREGNANCY: ICD-10-CM

## 2024-01-15 PROCEDURE — PNV: Performed by: OBSTETRICS & GYNECOLOGY

## 2024-01-15 PROCEDURE — 87150 DNA/RNA AMPLIFIED PROBE: CPT | Performed by: OBSTETRICS & GYNECOLOGY

## 2024-01-15 NOTE — PROGRESS NOTES
"Assessment & Plan  28 y.o.  at 36w3d presenting for routine prenatal visit.     Problem List       36 weeks gestation of pregnancy    Overview     Prenatal labs wnl  NIPT wnl, patient declines MSAFP  Contraception: unsure, did not have good experiences with Mirena or Paragard   23: follow up missed anatomy in 4 weeks  Delivery consent [x]  Flu vaccine [x]  GBS collected  Desires eIOL          Maternal obesity, antepartum, third trimester     Other Visit Diagnoses       Encounter for pregnancy related examination, third trimester    -  Primary          ____________________________________________________________  Subjective  She is without complaint.   She denies contractions, loss of fluid, or vaginal bleeding.   She feels regular fetal movements.     Objective  /72 (BP Location: Right arm, Patient Position: Sitting, Cuff Size: Standard)   Pulse 72   Ht 5' 4\" (1.626 m)   Wt 90.1 kg (198 lb 9.6 oz)   LMP 2023 (Exact Date)   BMI 34.09 kg/m²   FHR: 140 bpm    Physical Exam  Constitutional:       Appearance: Normal appearance.   Genitourinary:      Vulva normal.   HENT:      Head: Normocephalic and atraumatic.   Cardiovascular:      Rate and Rhythm: Normal rate.   Pulmonary:      Effort: Pulmonary effort is normal. No respiratory distress.   Abdominal:      Palpations: Abdomen is soft.      Tenderness: There is no abdominal tenderness.      Comments: Fundal height 36 cm   Musculoskeletal:         General: Normal range of motion.   Neurological:      Mental Status: She is alert.   Skin:     General: Skin is warm and dry.          Patient's Active Problem List  Patient Active Problem List   Diagnosis    36 weeks gestation of pregnancy    Maternal obesity, antepartum, third trimester           Mile Bearden MD  1/15/2024  9:35 AM          "

## 2024-01-17 LAB — GP B STREP DNA SPEC QL NAA+PROBE: NEGATIVE

## 2024-01-23 ENCOUNTER — ROUTINE PRENATAL (OUTPATIENT)
Dept: OBGYN CLINIC | Facility: CLINIC | Age: 29
End: 2024-01-23

## 2024-01-23 VITALS
DIASTOLIC BLOOD PRESSURE: 72 MMHG | BODY MASS INDEX: 33.97 KG/M2 | WEIGHT: 199 LBS | HEART RATE: 73 BPM | HEIGHT: 64 IN | SYSTOLIC BLOOD PRESSURE: 116 MMHG

## 2024-01-23 DIAGNOSIS — Z3A.37 37 WEEKS GESTATION OF PREGNANCY: ICD-10-CM

## 2024-01-23 DIAGNOSIS — O99.213 MATERNAL OBESITY, ANTEPARTUM, THIRD TRIMESTER: Primary | ICD-10-CM

## 2024-01-23 PROCEDURE — PNV: Performed by: OBSTETRICS & GYNECOLOGY

## 2024-01-23 NOTE — PROGRESS NOTES
Pt is a 28 y.o.  37w4d  Pregnancy is complicated by obesity.      Pt reports +FM. Denies vb, lof. Notes  ctx. Labor precautions and fkc reviewed    GBS negative and reviewed with patient    SVE: 1/thick/hi  IOL consent reviewed and signed. Will plan 24 if no spontaneous labor    F/u 1 week.

## 2024-01-24 ENCOUNTER — OFFICE VISIT (OUTPATIENT)
Dept: OBGYN CLINIC | Facility: CLINIC | Age: 29
End: 2024-01-24

## 2024-01-24 VITALS
SYSTOLIC BLOOD PRESSURE: 136 MMHG | BODY MASS INDEX: 34.25 KG/M2 | DIASTOLIC BLOOD PRESSURE: 72 MMHG | WEIGHT: 200.6 LBS | HEIGHT: 64 IN

## 2024-01-24 DIAGNOSIS — O26.893 VAGINAL DISCHARGE DURING PREGNANCY IN THIRD TRIMESTER: Primary | ICD-10-CM

## 2024-01-24 DIAGNOSIS — Z3A.37 37 WEEKS GESTATION OF PREGNANCY: ICD-10-CM

## 2024-01-24 DIAGNOSIS — N89.8 VAGINAL DISCHARGE DURING PREGNANCY IN THIRD TRIMESTER: Primary | ICD-10-CM

## 2024-01-24 PROCEDURE — PNV: Performed by: NURSE PRACTITIONER

## 2024-01-24 NOTE — PROGRESS NOTES
29 yo  at 37w5d gestation, presents to rule out rupture of membranes.    She reports that this morning she lost her mucus plug.  5 hours later went to go urinate and saw that her undergarments were wet and that it was not urine.  Has continued to feels slow dribbles of fluid since that time.  + michelle campbell contractions, irregular; denies bleeding.  Baby is active  BP normal    Speculum exam:   + vaginal discharge  Pooling negative  Nitrazine negative  Ferning negative    Reassured patient that her leakage was vaginal discharge only.  RV as scheduled for her next PNV.

## 2024-01-29 ENCOUNTER — ROUTINE PRENATAL (OUTPATIENT)
Dept: OBGYN CLINIC | Facility: CLINIC | Age: 29
End: 2024-01-29

## 2024-01-29 VITALS
DIASTOLIC BLOOD PRESSURE: 80 MMHG | BODY MASS INDEX: 34.11 KG/M2 | HEIGHT: 64 IN | SYSTOLIC BLOOD PRESSURE: 118 MMHG | HEART RATE: 90 BPM | WEIGHT: 199.8 LBS

## 2024-01-29 DIAGNOSIS — O09.93 ENCOUNTER FOR SUPERVISION OF HIGH RISK PREGNANCY IN THIRD TRIMESTER, ANTEPARTUM: Primary | ICD-10-CM

## 2024-01-29 DIAGNOSIS — Z3A.38 38 WEEKS GESTATION OF PREGNANCY: ICD-10-CM

## 2024-01-29 DIAGNOSIS — O99.213 MATERNAL OBESITY, ANTEPARTUM, THIRD TRIMESTER: ICD-10-CM

## 2024-01-29 PROCEDURE — PNV: Performed by: OBSTETRICS & GYNECOLOGY

## 2024-01-29 NOTE — PROGRESS NOTES
"Assessment & Plan  28 y.o.  at 38w3d presenting for routine prenatal visit.     Problem List       38 weeks gestation of pregnancy    Overview     Prenatal labs wnl  NIPT wnl, patient declines MSAFP  Contraception: unsure, did not have good experiences with Mirena or Paragard  Delivery consent [x]  Flu vaccine [x]  GBS collected  Desires eIOL          Maternal obesity, antepartum, third trimester     Other Visit Diagnoses       Encounter for supervision of high risk pregnancy in third trimester, antepartum    -  Primary          ____________________________________________________________  Subjective  She is without complaint.   She denies contractions, loss of fluid, or vaginal bleeding.   She feels regular fetal movements.     Objective  /80 (BP Location: Left arm, Patient Position: Sitting, Cuff Size: Standard)   Pulse 90   Ht 5' 4\" (1.626 m)   Wt 90.6 kg (199 lb 12.8 oz)   LMP 2023 (Exact Date)   BMI 34.30 kg/m²   FHR: 120 bpm  SVE 1/thick/high  Confirmed VTX on limited TAUS US 24    Physical Exam  Constitutional:       Appearance: Normal appearance.   Genitourinary:      Vulva normal.      No vaginal discharge.   HENT:      Head: Normocephalic and atraumatic.   Cardiovascular:      Rate and Rhythm: Normal rate.   Pulmonary:      Effort: Pulmonary effort is normal. No respiratory distress.   Abdominal:      Palpations: Abdomen is soft.      Tenderness: There is no abdominal tenderness.   Musculoskeletal:         General: Normal range of motion.   Neurological:      Mental Status: She is alert.   Skin:     General: Skin is warm and dry.          Patient's Active Problem List  Patient Active Problem List   Diagnosis    38 weeks gestation of pregnancy    Maternal obesity, antepartum, third trimester           Mile Bearden MD  2024  9:37 AM          "

## 2024-02-02 ENCOUNTER — ROUTINE PRENATAL (OUTPATIENT)
Dept: OBGYN CLINIC | Facility: CLINIC | Age: 29
End: 2024-02-02

## 2024-02-02 VITALS
BODY MASS INDEX: 34.62 KG/M2 | HEIGHT: 64 IN | WEIGHT: 202.8 LBS | DIASTOLIC BLOOD PRESSURE: 84 MMHG | SYSTOLIC BLOOD PRESSURE: 118 MMHG

## 2024-02-02 DIAGNOSIS — O99.213 MATERNAL OBESITY, ANTEPARTUM, THIRD TRIMESTER: Primary | ICD-10-CM

## 2024-02-02 DIAGNOSIS — Z3A.39 39 WEEKS GESTATION OF PREGNANCY: ICD-10-CM

## 2024-02-02 PROCEDURE — PNV: Performed by: OBSTETRICS & GYNECOLOGY

## 2024-02-02 NOTE — PROGRESS NOTES
"Assessment & Plan  28 y.o.  at 39w0d presenting for routine prenatal visit.       Problem List       39 weeks gestation of pregnancy    Overview     Prenatal labs wnl  NIPT wnl, patient declines MSAFP  Contraception: unsure, did not have good experiences with Mirena or Paragard  Delivery consent [x]  Flu vaccine [x]  GBS negative  eIOL  scheduled         Maternal obesity, antepartum, third trimester     ____________________________________________________________  Subjective  She is without complaint.   She denies contractions, loss of fluid, or vaginal bleeding.   She feels regular fetal movements.       Objective  /84 (BP Location: Right arm, Patient Position: Sitting, Cuff Size: Standard)   Ht 5' 4\" (1.626 m)   Wt 92 kg (202 lb 12.8 oz)   LMP 2023 (Exact Date)   BMI 34.81 kg/m²   FHR: 130's via doppler    Physical Exam  Constitutional:       Appearance: She is well-developed.   Cardiovascular:      Rate and Rhythm: Normal rate and regular rhythm.      Heart sounds: Normal heart sounds. No murmur heard.     No friction rub. No gallop.   Pulmonary:      Effort: Pulmonary effort is normal. No respiratory distress.      Breath sounds: No wheezing.   Abdominal:      Palpations: Abdomen is soft.      Tenderness: There is no abdominal tenderness.   Musculoskeletal:         General: No tenderness.   Neurological:      Mental Status: She is alert and oriented to person, place, and time.   Vitals reviewed.          Patient's Active Problem List  Patient Active Problem List   Diagnosis    39 weeks gestation of pregnancy    Maternal obesity, antepartum, third trimester           Elpidio Zimmerman MD  2024  1:04 PM          "

## 2024-02-09 ENCOUNTER — HOSPITAL ENCOUNTER (INPATIENT)
Facility: HOSPITAL | Age: 29
LOS: 1 days | Discharge: HOME | End: 2024-02-10
Attending: OBSTETRICS & GYNECOLOGY | Admitting: OBSTETRICS & GYNECOLOGY
Payer: COMMERCIAL

## 2024-02-09 ENCOUNTER — ANESTHESIA (INPATIENT)
Dept: ANESTHESIOLOGY | Facility: HOSPITAL | Age: 29
End: 2024-02-09
Payer: COMMERCIAL

## 2024-02-09 ENCOUNTER — HOSPITAL ENCOUNTER (OUTPATIENT)
Dept: LABOR AND DELIVERY | Facility: HOSPITAL | Age: 29
Discharge: HOME | End: 2024-02-09
Payer: COMMERCIAL

## 2024-02-09 ENCOUNTER — ANESTHESIA EVENT (INPATIENT)
Dept: ANESTHESIOLOGY | Facility: HOSPITAL | Age: 29
End: 2024-02-09
Payer: COMMERCIAL

## 2024-02-09 DIAGNOSIS — O13.3 GESTATIONAL HYPERTENSION, THIRD TRIMESTER: ICD-10-CM

## 2024-02-09 DIAGNOSIS — Z3A.40 40 WEEKS GESTATION OF PREGNANCY: Primary | ICD-10-CM

## 2024-02-09 PROBLEM — Z34.90 ENCOUNTER FOR ELECTIVE INDUCTION OF LABOR: Status: ACTIVE | Noted: 2024-02-09

## 2024-02-09 LAB
ABO GROUP BLD: NORMAL
BASE EXCESS BLDCOA CALC-SCNC: -4.1 MMOL/L (ref 3–11)
BASE EXCESS BLDCOV CALC-SCNC: -2.6 MMOL/L (ref 1–9)
BLD GP AB SCN SERPL QL: NEGATIVE
ERYTHROCYTE [DISTWIDTH] IN BLOOD BY AUTOMATED COUNT: 13.1 % (ref 11.6–15.1)
HCO3 BLDCOA-SCNC: 23.7 MMOL/L (ref 17.3–27.3)
HCO3 BLDCOV-SCNC: 22.1 MMOL/L (ref 12.2–28.6)
HCT VFR BLD AUTO: 35.8 % (ref 34.8–46.1)
HGB BLD-MCNC: 11.7 G/DL (ref 11.5–15.4)
HOLD SPECIMEN: YES
MCH RBC QN AUTO: 25.9 PG (ref 26.8–34.3)
MCHC RBC AUTO-ENTMCNC: 32.7 G/DL (ref 31.4–37.4)
MCV RBC AUTO: 79 FL (ref 82–98)
O2 CT VFR BLDCOA CALC: 5.9 ML/DL
OXYHGB MFR BLDCOA: 27.9 %
OXYHGB MFR BLDCOV: 82 %
PCO2 BLDCOA: 54 MM[HG] (ref 30–60)
PCO2 BLDCOV: 38.1 MM HG (ref 27–43)
PH BLDCOA: 7.26 [PH] (ref 7.23–7.43)
PH BLDCOV: 7.38 [PH] (ref 7.19–7.49)
PLATELET # BLD AUTO: 204 THOUSANDS/UL (ref 149–390)
PMV BLD AUTO: 11.1 FL (ref 8.9–12.7)
PO2 BLDCOA: 14.8 MM HG (ref 5–25)
PO2 BLDCOV: 37.1 MM HG (ref 15–45)
RBC # BLD AUTO: 4.52 MILLION/UL (ref 3.81–5.12)
RH BLD: POSITIVE
SAO2 % BLDCOV: 17.4 ML/DL
SPECIMEN EXPIRATION DATE: NORMAL
TREPONEMA PALLIDUM IGG+IGM AB [PRESENCE] IN SERUM OR PLASMA BY IMMUNOASSAY: NORMAL
WBC # BLD AUTO: 7.06 THOUSAND/UL (ref 4.31–10.16)

## 2024-02-09 PROCEDURE — 82805 BLOOD GASES W/O2 SATURATION: CPT | Performed by: OBSTETRICS & GYNECOLOGY

## 2024-02-09 PROCEDURE — 59400 OBSTETRICAL CARE: CPT | Performed by: OBSTETRICS & GYNECOLOGY

## 2024-02-09 PROCEDURE — 10907ZC DRAINAGE OF AMNIOTIC FLUID, THERAPEUTIC FROM PRODUCTS OF CONCEPTION, VIA NATURAL OR ARTIFICIAL OPENING: ICD-10-PCS | Performed by: OBSTETRICS & GYNECOLOGY

## 2024-02-09 PROCEDURE — 86900 BLOOD TYPING SEROLOGIC ABO: CPT

## 2024-02-09 PROCEDURE — 85027 COMPLETE CBC AUTOMATED: CPT

## 2024-02-09 PROCEDURE — 86901 BLOOD TYPING SEROLOGIC RH(D): CPT

## 2024-02-09 PROCEDURE — NC001 PR NO CHARGE: Performed by: OBSTETRICS & GYNECOLOGY

## 2024-02-09 PROCEDURE — 86780 TREPONEMA PALLIDUM: CPT

## 2024-02-09 PROCEDURE — 86850 RBC ANTIBODY SCREEN: CPT

## 2024-02-09 PROCEDURE — 4A1HXCZ MONITORING OF PRODUCTS OF CONCEPTION, CARDIAC RATE, EXTERNAL APPROACH: ICD-10-PCS | Performed by: OBSTETRICS & GYNECOLOGY

## 2024-02-09 PROCEDURE — 3E033VJ INTRODUCTION OF OTHER HORMONE INTO PERIPHERAL VEIN, PERCUTANEOUS APPROACH: ICD-10-PCS | Performed by: OBSTETRICS & GYNECOLOGY

## 2024-02-09 RX ORDER — DOCUSATE SODIUM 100 MG/1
100 CAPSULE, LIQUID FILLED ORAL 2 TIMES DAILY
Status: DISCONTINUED | OUTPATIENT
Start: 2024-02-09 | End: 2024-02-10 | Stop reason: HOSPADM

## 2024-02-09 RX ORDER — BENZOCAINE/MENTHOL 6 MG-10 MG
1 LOZENGE MUCOUS MEMBRANE DAILY PRN
Status: DISCONTINUED | OUTPATIENT
Start: 2024-02-09 | End: 2024-02-10 | Stop reason: HOSPADM

## 2024-02-09 RX ORDER — BUPIVACAINE HYDROCHLORIDE 2.5 MG/ML
30 INJECTION, SOLUTION EPIDURAL; INFILTRATION; INTRACAUDAL ONCE AS NEEDED
Status: DISCONTINUED | OUTPATIENT
Start: 2024-02-09 | End: 2024-02-09

## 2024-02-09 RX ORDER — LIDOCAINE HYDROCHLORIDE AND EPINEPHRINE 15; 5 MG/ML; UG/ML
INJECTION, SOLUTION EPIDURAL AS NEEDED
Status: DISCONTINUED | OUTPATIENT
Start: 2024-02-09 | End: 2024-02-10 | Stop reason: HOSPADM

## 2024-02-09 RX ORDER — IBUPROFEN 600 MG/1
600 TABLET ORAL EVERY 6 HOURS
Status: DISCONTINUED | OUTPATIENT
Start: 2024-02-09 | End: 2024-02-10 | Stop reason: HOSPADM

## 2024-02-09 RX ORDER — ROPIVACAINE HYDROCHLORIDE 2 MG/ML
INJECTION, SOLUTION EPIDURAL; INFILTRATION; PERINEURAL AS NEEDED
Status: DISCONTINUED | OUTPATIENT
Start: 2024-02-09 | End: 2024-02-10 | Stop reason: HOSPADM

## 2024-02-09 RX ORDER — OXYTOCIN/RINGER'S LACTATE 30/500 ML
1-30 PLASTIC BAG, INJECTION (ML) INTRAVENOUS
Status: DISCONTINUED | OUTPATIENT
Start: 2024-02-09 | End: 2024-02-09

## 2024-02-09 RX ORDER — ONDANSETRON 2 MG/ML
4 INJECTION INTRAMUSCULAR; INTRAVENOUS EVERY 6 HOURS PRN
Status: DISCONTINUED | OUTPATIENT
Start: 2024-02-09 | End: 2024-02-09

## 2024-02-09 RX ORDER — ACETAMINOPHEN 325 MG/1
650 TABLET ORAL EVERY 4 HOURS PRN
Status: DISCONTINUED | OUTPATIENT
Start: 2024-02-09 | End: 2024-02-10 | Stop reason: HOSPADM

## 2024-02-09 RX ORDER — ONDANSETRON 2 MG/ML
4 INJECTION INTRAMUSCULAR; INTRAVENOUS EVERY 8 HOURS PRN
Status: DISCONTINUED | OUTPATIENT
Start: 2024-02-09 | End: 2024-02-10 | Stop reason: HOSPADM

## 2024-02-09 RX ORDER — SODIUM CHLORIDE, SODIUM LACTATE, POTASSIUM CHLORIDE, CALCIUM CHLORIDE 600; 310; 30; 20 MG/100ML; MG/100ML; MG/100ML; MG/100ML
125 INJECTION, SOLUTION INTRAVENOUS CONTINUOUS
Status: DISCONTINUED | OUTPATIENT
Start: 2024-02-09 | End: 2024-02-09

## 2024-02-09 RX ORDER — CALCIUM CARBONATE 500 MG/1
1000 TABLET, CHEWABLE ORAL DAILY PRN
Status: DISCONTINUED | OUTPATIENT
Start: 2024-02-09 | End: 2024-02-10 | Stop reason: HOSPADM

## 2024-02-09 RX ORDER — OXYTOCIN/RINGER'S LACTATE 30/500 ML
250 PLASTIC BAG, INJECTION (ML) INTRAVENOUS ONCE
Status: DISCONTINUED | OUTPATIENT
Start: 2024-02-09 | End: 2024-02-10 | Stop reason: HOSPADM

## 2024-02-09 RX ADMIN — ROPIVACAINE HYDROCHLORIDE 5 ML: 2 INJECTION EPIDURAL; INFILTRATION; PERINEURAL at 12:38

## 2024-02-09 RX ADMIN — ROPIVACAINE HYDROCHLORIDE: 2 INJECTION, SOLUTION EPIDURAL; INFILTRATION at 13:00

## 2024-02-09 RX ADMIN — ROPIVACAINE HYDROCHLORIDE 10 ML/HR: 2 INJECTION EPIDURAL; INFILTRATION; PERINEURAL at 12:39

## 2024-02-09 RX ADMIN — Medication 2 MILLI-UNITS/MIN: at 09:48

## 2024-02-09 RX ADMIN — SODIUM CHLORIDE, POTASSIUM CHLORIDE, SODIUM LACTATE AND CALCIUM CHLORIDE 125 ML/HR: 600; 310; 30; 20 INJECTION, SOLUTION INTRAVENOUS at 09:47

## 2024-02-09 RX ADMIN — IBUPROFEN 600 MG: 600 TABLET, FILM COATED ORAL at 19:16

## 2024-02-09 RX ADMIN — ROPIVACAINE HYDROCHLORIDE 2 ML: 2 INJECTION EPIDURAL; INFILTRATION; PERINEURAL at 12:41

## 2024-02-09 RX ADMIN — SODIUM CHLORIDE, POTASSIUM CHLORIDE, SODIUM LACTATE AND CALCIUM CHLORIDE 999 ML/HR: 600; 310; 30; 20 INJECTION, SOLUTION INTRAVENOUS at 16:29

## 2024-02-09 RX ADMIN — SODIUM CHLORIDE, POTASSIUM CHLORIDE, SODIUM LACTATE AND CALCIUM CHLORIDE 125 ML/HR: 600; 310; 30; 20 INJECTION, SOLUTION INTRAVENOUS at 09:48

## 2024-02-09 RX ADMIN — ACETAMINOPHEN 325MG 650 MG: 325 TABLET ORAL at 21:06

## 2024-02-09 RX ADMIN — SODIUM CHLORIDE, POTASSIUM CHLORIDE, SODIUM LACTATE AND CALCIUM CHLORIDE 999 ML/HR: 600; 310; 30; 20 INJECTION, SOLUTION INTRAVENOUS at 12:22

## 2024-02-09 RX ADMIN — LIDOCAINE HYDROCHLORIDE AND EPINEPHRINE 3 ML: 15; 5 INJECTION, SOLUTION EPIDURAL at 12:34

## 2024-02-09 NOTE — OB LABOR/OXYTOCIN SAFETY PROGRESS
Oxytocin Safety Progress Check Note - Araseli Conte 28 y.o. female MRN: [id number]    Unit/Bed#: L&D 322-01 Encounter: [id number]    Dose (jame-units/min) Oxytocin: 10 jame-units/min  Contraction Frequency (minutes): 2-3  Contraction Intensity: Mild  Uterine Activity Characteristics: Regular  Cervical Dilation: 4        Cervical Effacement: 70  Fetal Station: -1  Baseline Rate (FHR): 140 bpm  Fetal Heart Rate (FHT): 135 BPM  FHR Category: 1               Vital Signs:   Vitals:    02/09/24 1337   BP: 123/83   Pulse: 70   Resp:    Temp:    SpO2:        Notes/comments:   Pt comfortable with epidural  AROM for clear fluid  Continue pitocin     Lilian Meredith MD 2/9/2024 2:35 PM

## 2024-02-09 NOTE — OB LABOR/OXYTOCIN SAFETY PROGRESS
Oxytocin Safety Progress Check Note - Araseli Conte 28 y.o. female MRN: [id number]    Unit/Bed#: L&D 322-01 Encounter: [id number]    Dose (jame-units/min) Oxytocin: 10 jame-units/min  Contraction Frequency (minutes): 1-4  Contraction Intensity: Mild/Moderate  Uterine Activity Characteristics: Regular  Cervical Dilation: 5        Cervical Effacement: 90  Fetal Station: -1  Baseline Rate (FHR): 135 bpm  Fetal Heart Rate (FHT): 135 BPM  FHR Category: CAT II               Vital Signs:   Vitals:    02/09/24 1644   BP:    Pulse:    Resp:    Temp: 98.5 °F (36.9 °C)   SpO2:        Notes/comments:   SVE as above. FHT is cAT II due to recurrent late decelerations during tachysystolic contractions. Patient repositioned, continues to get fluid bolus and Pitocin turned down to  10mu/min . Plan to continue to monitor FHT and continue pitocin titrations if FHT is CAT I.       Dr. Meredith aware     Mayda Colorado MD 2/9/2024 4:45 PM

## 2024-02-09 NOTE — DISCHARGE SUMMARY
Discharge Summary - Araseli Conte 28 y.o. female MRN: [id number]    Unit/Bed#: L&D 322-01 Encounter: [id number]    Admission Date: 2024     Discharge Date: 2/10/2024    Admitting Diagnosis:   Patient Active Problem List   Diagnosis    40 weeks gestation of pregnancy    Maternal obesity, antepartum, third trimester    Encounter for elective induction of labor       Discharge Diagnosis:   Same, delivered  Gestational hypertension     Procedures:   spontaneous vaginal delivery    Admitting Attending: Dr. Lilian Meredith MD  Delivery Attending: Dr. Lilian Meredith MD  Discharge Attending: Dr. Kristen Sosa MD    Hospital Course:     Araseli Conte is a 28 y.o.  who was admitted at 40w0d for eIOL. Her induction was started with pitocin.She made continuous cervical change and received an epidural for pain control. She was AROM'd for clear moderate fluid. She proceeded to make cervical change and became complete and started pushing.     She then underwent an uncomplicated spontaneous vaginal delivery and delivered a viable female  at 1733. APGARS were 8, 9 at 1 and 5 minutes, respectively.  weighed 8lb 0oz. Placenta was delivered thereafter.   was then transferred to  nursery. Patient tolerated the procedure well and was transferred to recovery in stable condition.     The patient's post partum course was unremarkable. On chart review prior to discharge, patient was noted to have 2 mildly elevated blood pressures throughout her admission meeting criteria for gestational hypertension.  Patient remained asymptomatic.  Patient was normotensive for 24 hours prior to discharge.  Patient was enrolled in home blood pressure monitoring program and instructed to schedule a follow-up appointment in 1 week for blood pressure check.    On day of discharge, she was ambulating and able to reasonably perform all ADLs.  She was voiding and had appropriate bowel function. Pain was well controlled.  She was discharged home on postpartum day #1 without complications. Patient was instructed to follow up with her OB as an outpatient and was given appropriate warnings to call provider if she develops signs of infection or uncontrolled pain.    On day of discharge she was ambulating, voiding spontaneously, tolerating oral intake and hemodynamically stable.  She is breast feeding .  Mom's blood type is O positive. RhoGAM is not indicated.    Condition at discharge:   good     Disposition:   Home    Planned Readmission:   No    Discharge Medications:   Prenatal vitamin daily for 6 months or the duration of nursing whichever is longer.  Motrin 600 mg orally every 6 hours as needed for pain  Tylenol (over the counter) per bottle directions as needed for pain  Hydrocortisone cream 1% (over the counter) applied 1-2x daily to hemorrhoids as needed  Witch hazel pads for hemorrhoidal discomfort as needed  Blood pressure cuff automatic     Discharge instructions :   -Do not place anything (no partner, tampons or douche) in your vagina for 6 weeks.  -You may walk for exercise for the first 6 weeks then gradually return to your usual activities.   -Please do not drive for 1 week if you have no stitches and for 2 weeks if you have stitches or underwent a  delivery.    -You may take baths or shower per your preference.   -Please look at your bust (breasts) in the mirror daily and call provider for redness or tenderness or increased warmth.   - If you have had a  please look at your incision daily as well and call provider for increasing redness or steady drainage from the incision.   -Please call your provider if temperature > 100.4*F or 38* C, worsening pain or a foul discharge.    Estela Castorena MD  PGY-1 OB/GYN

## 2024-02-09 NOTE — H&P
H & P- Obstetrics   Araseli Conte 28 y.o. female MRN: [id number]  Unit/Bed#: L&D 322-01 Encounter: [id number]    Assessment: 28 y.o.  at 40w0d admitted for elective induction of labor.    Plan:   40 weeks gestation of pregnancy  Assessment & Plan  Admit for eIOL   Admission labs: CBC/Syphilis Screen/Type and Screen  FEN: clear liquid diet    cc/hr   Analgesia at maternal request   Rh positive - postpartum rhogam not indicated   GBS negative - prophylaxis not indicated     * Encounter for elective induction of labor  Assessment & Plan  Admit for eIOL   SVE 3-/-2 will begin IOL with pitocin titration          Discussed case and plan w/ Dr. Merediht      Chief Complaint: Here for my induction     HPI: Araseli Conte is a 28 y.o.  with an NELSY of 2024, by Last Menstrual Period at 40w0d who is being admitted for elective induction of labor. She reports occasional, irregular contractions, has no LOF, and reports no VB. She states she has felt good FM.    Patient Active Problem List   Diagnosis    40 weeks gestation of pregnancy    Maternal obesity, antepartum, third trimester    Encounter for elective induction of labor       Baby complications/comments: none    Review of Systems   All other systems reviewed and are negative.      OB Hx:  OB History    Para Term  AB Living   3 1 1 0 1 1   SAB IAB Ectopic Multiple Live Births   0 1 0 0 1      # Outcome Date GA Lbr Rik/2nd Weight Sex Delivery Anes PTL Lv   3 Current            2 IAB 21     TAB         Birth Comments: medically induced ab   1 Term 20 39w4d / 01:13 3230 g (7 lb 1.9 oz) F Vag-Spont EPI  SUZANNE      Obstetric Comments   Menarche: 13   Cycles regular, Q29d, x 4d       Past Medical Hx:  Past Medical History:   Diagnosis Date    Asthma     Extrinsic asthma    Depression 2020    Not officially diagnosed until after I had my daughter    Post partum depression 2020    Varicella        Past Surgical  hx:  Past Surgical History:   Procedure Laterality Date    KIDNEY SURGERY Bilateral     bilateral ureter reimplantation, 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     Physical Exam:  Physical Exam  Constitutional:       General: She is not in acute distress.     Appearance: Normal appearance.   HENT:      Head: Normocephalic and atraumatic.   Cardiovascular:      Rate and Rhythm: Normal rate.   Pulmonary:      Effort: Pulmonary effort is normal.   Abdominal:      Palpations: Abdomen is soft.      Tenderness: There is no abdominal tenderness.      Comments: Gravid   Neurological:      General: No focal deficit present.      Mental Status: She is alert.   Skin:     General: Skin is warm and dry.   Psychiatric:         Mood and Affect: Mood normal.            FHT:  Baseline Rate (FHR): 135 bpm  FHR Category: Category I    TOCO:   Contraction Frequency (minutes): 5  Contraction Duration (seconds): 45  Contraction Intensity: Mild    Lab Results   Component Value Date    WBC 7.06 02/09/2024    HGB 11.7 02/09/2024    HCT 35.8 02/09/2024     02/09/2024     Lab Results   Component Value Date    K 3.6 11/24/2023     11/24/2023    CO2 25 11/24/2023    BUN 7 11/24/2023    CREATININE 0.58 (L) 11/24/2023    AST 10 (L) 11/24/2023    ALT 6 (L) 11/24/2023     Prenatal Labs: Reviewed      Blood type: O positive   Antibody: Negative  GBS: Negative  HIV: non-reactive  Rubella: immune  Syphilis IgM/IgG: non-reactive  HBsAg: non-reactive  HCAb: non-reactive  Chlamydia: negative  Gonorrhea: negative  Diabetes 1 hour screen: 92  Platelets: 241k  Hgb: 11.9  >2 Midnights  INPATIENT      Signature/Title: Emily Nolasco MD  Date: 2/9/2024  Time: 10:10 AM

## 2024-02-09 NOTE — PLAN OF CARE
Problem: BIRTH - VAGINAL/ SECTION  Goal: Fetal and maternal status remain reassuring during the birth process  Description: INTERVENTIONS:  - Monitor vital signs  - Monitor fetal heart rate  - Monitor uterine activity  - Monitor labor progression (vaginal delivery)  - DVT prophylaxis  - Antibiotic prophylaxis  Outcome: Progressing  Goal: Emotionally satisfying birthing experience for mother/fetus  Description: Interventions:  - Assess, plan, implement and evaluate the nursing care given to the patient in labor  - Advocate the philosophy that each childbirth experience is a unique experience and support the family's chosen level of involvement and control during the labor process   - Actively participate in both the patient's and family's teaching of the birth process  - Consider cultural, Gnosticist and age-specific factors and plan care for the patient in labor  Outcome: Progressing     Problem: PAIN - ADULT  Goal: Verbalizes/displays adequate comfort level or baseline comfort level  Description: Interventions:  - Encourage patient to monitor pain and request assistance  - Assess pain using appropriate pain scale  - Administer analgesics based on type and severity of pain and evaluate response  - Implement non-pharmacological measures as appropriate and evaluate response  - Consider cultural and social influences on pain and pain management  - Notify physician/advanced practitioner if interventions unsuccessful or patient reports new pain  Outcome: Progressing     Problem: DISCHARGE PLANNING  Goal: Discharge to home or other facility with appropriate resources  Description: INTERVENTIONS:  - Identify barriers to discharge w/patient and caregiver  - Arrange for needed discharge resources and transportation as appropriate  - Identify discharge learning needs (meds, wound care, etc.)  - Arrange for interpretive services to assist at discharge as needed  - Refer to Case Management Department for coordinating  discharge planning if the patient needs post-hospital services based on physician/advanced practitioner order or complex needs related to functional status, cognitive ability, or social support system  Outcome: Progressing

## 2024-02-09 NOTE — ASSESSMENT & PLAN NOTE
Admit for eIOL   Admission labs: CBC/Syphilis Screen/Type and Screen  FEN: clear liquid diet    cc/hr   Analgesia at maternal request   Rh positive - postpartum rhogam not indicated   GBS negative - prophylaxis not indicated

## 2024-02-09 NOTE — PLAN OF CARE
Problem: BIRTH - VAGINAL/ SECTION  Goal: Fetal and maternal status remain reassuring during the birth process  Description: INTERVENTIONS:  - Monitor vital signs  - Monitor fetal heart rate  - Monitor uterine activity  - Monitor labor progression (vaginal delivery)  - DVT prophylaxis  - Antibiotic prophylaxis  Outcome: Completed  Goal: Emotionally satisfying birthing experience for mother/fetus  Description: Interventions:  - Assess, plan, implement and evaluate the nursing care given to the patient in labor  - Advocate the philosophy that each childbirth experience is a unique experience and support the family's chosen level of involvement and control during the labor process   - Actively participate in both the patient's and family's teaching of the birth process  - Consider cultural, Mu-ism and age-specific factors and plan care for the patient in labor  Outcome: Completed

## 2024-02-09 NOTE — PLAN OF CARE
Problem: PAIN - ADULT  Goal: Verbalizes/displays adequate comfort level or baseline comfort level  Description: Interventions:  - Encourage patient to monitor pain and request assistance  - Assess pain using appropriate pain scale  - Administer analgesics based on type and severity of pain and evaluate response  - Implement non-pharmacological measures as appropriate and evaluate response  - Consider cultural and social influences on pain and pain management  - Notify physician/advanced practitioner if interventions unsuccessful or patient reports new pain  Outcome: Progressing     Problem: DISCHARGE PLANNING  Goal: Discharge to home or other facility with appropriate resources  Description: INTERVENTIONS:  - Identify barriers to discharge w/patient and caregiver  - Arrange for needed discharge resources and transportation as appropriate  - Identify discharge learning needs (meds, wound care, etc.)  - Arrange for interpretive services to assist at discharge as needed  - Refer to Case Management Department for coordinating discharge planning if the patient needs post-hospital services based on physician/advanced practitioner order or complex needs related to functional status, cognitive ability, or social support system  Outcome: Progressing     Problem: POSTPARTUM  Goal: Experiences normal postpartum course  Description: INTERVENTIONS:  - Monitor maternal vital signs  - Assess uterine involution and lochia  Outcome: Progressing  Goal: Appropriate maternal -  bonding  Description: INTERVENTIONS:  - Identify family support  - Assess for appropriate maternal/infant bonding   -Encourage maternal/infant bonding opportunities  - Referral to  or  as needed  Outcome: Progressing  Goal: Establishment of infant feeding pattern  Description: INTERVENTIONS:  - Assess breast/bottle feeding  - Refer to lactation as needed  Outcome: Progressing

## 2024-02-09 NOTE — OB LABOR/OXYTOCIN SAFETY PROGRESS
Oxytocin Safety Progress Check Note - Araseli Conte 28 y.o. female MRN: [id number]    Unit/Bed#: L&D 322-01 Encounter: [id number]    Dose (jame-units/min) Oxytocin: 14 jame-units/min  Contraction Frequency (minutes): 2-3  Contraction Intensity: Mild  Uterine Activity Characteristics: Regular  Cervical Dilation: 5        Cervical Effacement: 90  Fetal Station: -1  Baseline Rate (FHR): 130 bpm  Fetal Heart Rate (FHT): 135 BPM  FHR Category: CAT I                Vital Signs:   Vitals:    02/09/24 1556   BP:    Pulse:    Resp: 16   Temp: 98.6 °F (37 °C)   SpO2:        Notes/comments:   SVE as above. FHT is CAT II with intermittent variable and late decelerations. Patietn repositioned and given fluid bolus. Plan to continue to monitor Fht and recheck cervix in 2-4 hours.       Dr. Masoud Colorado MD 2/9/2024 4:34 PM

## 2024-02-09 NOTE — ANESTHESIA PREPROCEDURE EVALUATION
Procedure:  LABOR ANALGESIA    Relevant Problems   GYN   (+) 40 weeks gestation of pregnancy      (+) asthma  Physical Exam    Airway    Mallampati score: III  TM Distance: >3 FB  Neck ROM: full     Dental       Cardiovascular  Rhythm: regular    Pulmonary  Comment: Bilateral chest rise, not using accessory muscles for breathing     Other Findings  post-pubertal.      Anesthesia Plan  ASA Score- 2     Anesthesia Type- epidural with ASA Monitors.         Additional Monitors:     Airway Plan:            Plan Factors-    Chart reviewed.   Existing labs reviewed.                   Induction-     Postoperative Plan-     Informed Consent- Anesthetic plan and risks discussed with patient.

## 2024-02-09 NOTE — L&D DELIVERY NOTE
Delivery Summary - OB/GYN   Araseli Conte 28 y.o. female MRN: [id number]  Unit/Bed#: L&D 322-01 Encounter: [id number]    Pre-delivery Diagnosis:   1. 40w0d pregnancy  2. Maternal Obesity  3. GBS negative    Post-delivery Diagnosis: same, delivered    Attending: MD Masoud    Assistant(s): No qualified resident available                         FAVIAN Parra    Procedure:     Anesthesia: epidural    Quantified Blood Loss:  226 mL    Specimens:   1. Arterial and venous cord gases  2. Cord blood  3. Segment of umbilical cord  4. Placenta to storage     Complications:  None apparent    Findings:  1. Viable female  delivered on 24 at 1733 weight pending;  Apgar scores of 8 at one minute and 9 at five minutes.   2. Spontaneous delivery of placenta with centrally inserted 3-vessel cord         Disposition: Patient tolerated the procedure well and was recovering in labor and delivery room with family and  before being transferred to the post-partum floor.                 Procedure Details     Description of procedure    After pushing for 5 minutes, on 24 at 1733 patient delivered a viable female , weight pending, Apgars of 8 (1 min) and 9 (5 min). The fetal vertex delivered spontaneously. There was no nuchal cord. The anterior (right) shoulder delivered atraumatically with maternal expulsive forces and the assistance of downward traction. The posterior shoulder delivered with maternal expulsive forces and the assistance of upward traction. The remainder of the fetus delivered spontaneously.     Upon delivery, the infant was placed on the mothers abdomen and the cord was clamped and cut. Delayed cord clamping was achieved. The infant was noted to cry spontaneously and was moving all extremities appropriately. There was no evidence for injury. Awaiting nurse resuscitators evaluated the  at bedside. Arterial and venous cord blood gases and cord blood was collected for analysis.  These were promptly sent to the lab. In the immediate post-partum, 30 units of IV pitocin was administered and the uterus was noted to contract down well with massage and pitocin. The placenta delivered spontaneously at 1738 and was noted to have a centrally inserted 3 vessel cord.     The vagina, cervix, and perineum were inspected and there was noted to be no lacerations.      At the conclusion of the delivery, all needle, sponge, and instrument counts were noted to be correct. Patient tolerated the procedure well and was allowed to recover in labor and delivery room with family and  before being transferred to the post-partum floor.     Lilian Meredith MD

## 2024-02-09 NOTE — OB LABOR/OXYTOCIN SAFETY PROGRESS
Oxytocin Safety Progress Check Note - Araseli Conte 28 y.o. female MRN: [id number]    Unit/Bed#: L&D 322-01 Encounter: [id number]    Dose (jame-units/min) Oxytocin: 10 jame-units/min  Contraction Frequency (minutes): 3  Contraction Intensity: Mild  Uterine Activity Characteristics: Regular  Cervical Dilation: 4        Cervical Effacement: 70  Fetal Station: -2  Baseline Rate (FHR): 135 bpm  Fetal Heart Rate (FHT): 140 BPM  FHR Category: 1               Vital Signs:   Vitals:    02/09/24 1124   BP: 145/83   Pulse: 74   Resp:    Temp:        Notes/comments:   Pt reports more discomfort. Will AROM after epidural and pt comfortable    Lilian Meredith MD 2/9/2024 11:38 AM

## 2024-02-10 VITALS
TEMPERATURE: 97.8 F | WEIGHT: 202 LBS | DIASTOLIC BLOOD PRESSURE: 75 MMHG | OXYGEN SATURATION: 97 % | BODY MASS INDEX: 34.49 KG/M2 | HEART RATE: 70 BPM | RESPIRATION RATE: 16 BRPM | HEIGHT: 64 IN | SYSTOLIC BLOOD PRESSURE: 119 MMHG

## 2024-02-10 PROBLEM — Z34.90 ENCOUNTER FOR ELECTIVE INDUCTION OF LABOR: Status: RESOLVED | Noted: 2024-02-09 | Resolved: 2024-02-10

## 2024-02-10 PROBLEM — O13.9 GESTATIONAL HYPERTENSION: Status: ACTIVE | Noted: 2024-02-10

## 2024-02-10 PROBLEM — Z3A.40 40 WEEKS GESTATION OF PREGNANCY: Status: RESOLVED | Noted: 2023-08-10 | Resolved: 2024-02-10

## 2024-02-10 PROCEDURE — 99024 POSTOP FOLLOW-UP VISIT: CPT | Performed by: NURSE PRACTITIONER

## 2024-02-10 RX ORDER — ACETAMINOPHEN 325 MG/1
650 TABLET ORAL EVERY 6 HOURS PRN
Start: 2024-02-10

## 2024-02-10 RX ORDER — IBUPROFEN 600 MG/1
600 TABLET ORAL EVERY 6 HOURS PRN
Start: 2024-02-10

## 2024-02-10 RX ORDER — ADHESIVE BANDAGE 3/4"
BANDAGE TOPICAL DAILY
Qty: 1 EACH | Refills: 0 | Status: SHIPPED | OUTPATIENT
Start: 2024-02-10

## 2024-02-10 RX ADMIN — DOCUSATE SODIUM 100 MG: 100 CAPSULE, LIQUID FILLED ORAL at 17:55

## 2024-02-10 RX ADMIN — IBUPROFEN 600 MG: 600 TABLET, FILM COATED ORAL at 19:44

## 2024-02-10 RX ADMIN — DOCUSATE SODIUM 100 MG: 100 CAPSULE, LIQUID FILLED ORAL at 08:09

## 2024-02-10 RX ADMIN — IBUPROFEN 600 MG: 600 TABLET, FILM COATED ORAL at 06:56

## 2024-02-10 RX ADMIN — IBUPROFEN 600 MG: 600 TABLET, FILM COATED ORAL at 13:48

## 2024-02-10 NOTE — PROGRESS NOTES
Progress Note - OB/GYN   Araseli Conte 28 y.o. female MRN: [id number]  Unit/Bed#: L&D 305-01 Encounter: [id number]    Assessment:  PPD# 1 s/p Spontaneous Vaginal Delivery, stable    Plan:  Routine PP care.   Considering DC if infant able to go home    Subjective/Objective   Chief Complaint:     PPD#1 s/p Spontaneous Vaginal Delivery    Subjective:     Pain: no  Tolerating PO: yes  Voiding: yes  Flatus: yes  BM: no  Ambulating: yes  Breastfeeding: Breastfeeding  Chest pain: no  Shortness of breath: no  Leg pain: no  Lochia: moderate     Objective:     Vitals:  Vitals:    02/09/24 1916 02/09/24 2312 02/10/24 0300 02/10/24 0749   BP: 120/74 109/52 100/54 118/64   BP Location:  Right arm Right arm    Pulse:  72 67 79   Resp:  16 16 16   Temp:  98.6 °F (37 °C) 98.8 °F (37.1 °C) (!) 96.9 °F (36.1 °C)   TempSrc:  Oral Oral Temporal   SpO2:       Weight:       Height:           Physical Exam:     AAOx3, NAD  CV, RRR  CTA b/l, no WRR  Soft, non-tender, non-distended, no rebound or guarding  Uterine fundus firm and non-tender, -2 cm below the umbilicus   Non tender, negative Hayden's bilaterally    Lab, Imaging and other studies:     Lab Results   Component Value Date    WBC 7.06 02/09/2024    HGB 11.7 02/09/2024    HCT 35.8 02/09/2024    MCV 79 (L) 02/09/2024     02/09/2024               NAHEED Galevz  02/10/24

## 2024-02-10 NOTE — PROGRESS NOTES
Discharge teaching performed with patient, educational packet provided, as well as the save your life magnet.  Patient verbalized an understanding and was encouraged to continue to ask questions prior to discharge.

## 2024-02-10 NOTE — LACTATION NOTE
This note was copied from a baby's chart.  CONSULT - LACTATION  Baby Girl Conte (Sara) 1 days female MRN: [id number]    [facility] NURSERY Room / Bed: L&D 305(N)/L&D 305(N) Encounter: [id number]    Maternal Information     MOTHER:  Araseli Conte  Maternal Age: 28 y.o.   OB History: # 1 - Date: 20, Sex: Female, Weight: 3230 g (7 lb 1.9 oz), GA: 39w4d, Delivery: Vaginal, Spontaneous, Apgar1: 8, Apgar5: 9, Living: Living, Birth Comments: None    # 2 - Date: 21, Sex: None, Weight: None, GA: None, Delivery: Therapeutic , Apgar1: None, Apgar5: None, Living: None, Birth Comments: medically induced ab    # 3 - Date: 24, Sex: Female, Weight: 3630 g (8 lb), GA: 40w0d, Delivery: Vaginal, Spontaneous, Apgar1: 8, Apgar5: 9, Living: Living, Birth Comments: None   Previouse breast reduction surgery? No    Lactation history:   Has patient previously breast fed: Yes   How long had patient previously breast fed: About 5 months with first child   Previous breast feeding complications:       Past Surgical History:   Procedure Laterality Date    KIDNEY SURGERY Bilateral     bilateral ureter reimplantation, age 8    REMOVAL OF INTRAUTERINE DEVICE (IUD)  10/08/2020    Mirena IUD removed        Birth information:  YOB: 2023   Time of birth: 5:33 PM   Sex: female   Delivery type: Vaginal, Spontaneous   Birth Weight: 3630 g (8 lb)   Percent of Weight Change: 0%     Gestational Age: 40w0d   [unfilled]    Assessment     Breast and nipple assessment: no assessment completed at this encounter    [place] Assessment: no assessment completed at this encounter    Feeding assessment:  No feeding assessed at this time  LATCH:       Feeding recommendations:  breast feed on demand    Met with mother. Provided mother with Ready, Set, Baby booklet and Discharge breastfeeding booklet. Emphasized skin to skin, latching baby deeply and properly to avoid nipple pain. Reviewed  Feeding baby every 2-3 hours and expecting cluster feeding during growth spurts. Emphasized information on [facility] and [facility] for any issues that may arise with blocked ducts or mastitis. Talked about other topics that are covered in the Discharge book and encouraged mother to review and call if she has any questions.     Mother stated she is confident about breastfeeding and everything is going well. She has no questions or concerns at this time.     Encouraged mother to call for any assistance, questions, or concerns about breastfeeding. Extension provided.        Akanksha Valadez RN 2/10/2024 9:45 AM

## 2024-02-11 NOTE — ASSESSMENT & PLAN NOTE
Met criteria intrapartum   Systolic (24hrs), Av , Min:100 , Max:130   Diastolic (24hrs), Av, Min:52, Max:74  Asymptomatic   Will enroll in Home BP monitoring program

## 2024-02-15 ENCOUNTER — POSTPARTUM VISIT (OUTPATIENT)
Dept: OBGYN CLINIC | Facility: CLINIC | Age: 29
End: 2024-02-15

## 2024-02-15 VITALS
OXYGEN SATURATION: 99 % | HEIGHT: 64 IN | DIASTOLIC BLOOD PRESSURE: 72 MMHG | BODY MASS INDEX: 34.67 KG/M2 | SYSTOLIC BLOOD PRESSURE: 124 MMHG

## 2024-02-15 DIAGNOSIS — O13.3 GESTATIONAL HYPERTENSION, THIRD TRIMESTER: Primary | ICD-10-CM

## 2024-02-15 PROCEDURE — 99024 POSTOP FOLLOW-UP VISIT: CPT | Performed by: NURSE PRACTITIONER

## 2024-02-15 NOTE — PROGRESS NOTES
27yo   Presents for BP check one week post delivery due to h/o gHTN.  24 , 40w, 8 lb female.    Vitals:    02/15/24 1113   BP: 124/72   SpO2: 99%     Patient reports that her home BP readings have also been within normal range.    RV for routine postpartum exam.

## 2024-02-16 ENCOUNTER — TELEPHONE (OUTPATIENT)
Dept: OTHER | Facility: HOSPITAL | Age: 29
End: 2024-02-16

## 2024-02-16 NOTE — TELEPHONE ENCOUNTER
Reached out to patient re: 4 missed BP readings. Per patient, she had follow-up appointment where she was told her BP was WNL. Instructed patient to please submit BP tonight and we will follow up with office to see if she can discontinue program.

## 2024-02-17 LAB — PLACENTA IN STORAGE: NORMAL

## 2024-03-05 ENCOUNTER — NURSE TRIAGE (OUTPATIENT)
Age: 29
End: 2024-03-05

## 2024-03-05 NOTE — TELEPHONE ENCOUNTER
Called [facility].  Left a message on voicemail to review referral requesting an appt for patient.

## 2024-03-05 NOTE — TELEPHONE ENCOUNTER
"Call transferred to office.  Spoke to Patient.  EPDS done over the phone per Ilene.  Score was 24.  Ilene informed.  Placed referral to [facility].  Changed PP appt to 3/8/2024 with Dr. Bearden.  Patient states she does not currently have any thoughts of hurting herself but \"I feel like I don't want to be here\".  Instructed patient to go directly to ED if she has any thought of hurting herself.  States she is going to a relative's house so she is not alone.  Informed her that her message requesting medication will be reviewed by Ilene.  Patient requests a [employer] message to inform her if medication is sent to the pharmacy.    "

## 2024-03-05 NOTE — TELEPHONE ENCOUNTER
"Patient called in and is requesting to be placed on zoloft.  She states she is living by herself as a single mother. She denies feelings of wanting to harm or hurt herself or baby.  She states there is sometimes where she feels like she can't live.  Patient advised to go to the closest ER if feelings escalate.  She states she does not feel bad enough to do so right at this moment.  She reports that her support system is her aunt right now that she will be sleeping over to help her with baby.  She was offered appointments to be seen today but declines them as she has to  her older daughter from school.  Offered her an earlier appointment but also declines.  Patient requesting zoloft and confirms her pharmacy is [facility] in [place].  Please further advise and call patient with outcome.        Reason for Disposition   Mild depression symptoms and < 1 month since delivery    Answer Assessment - Initial Assessment Questions  1. CONCERN: \"What happened that made you call today?\"      Feeling depressed, crying   2. DEPRESSION SYMPTOM SCREENING: \"How are you feeling overall?\" (e.g., decreased energy, increased sleeping or difficulty sleeping, difficulty concentrating, feelings of sadness, guilt, hopelessness, or worthlessness; problems caring for baby)      Feelings emotional drained and hopeless   3. RISK OF HARM - SUICIDAL IDEATION:  \"Do you ever have thoughts of hurting yourself or the baby?\"  (e.g., yes, no; details).    - INTENT:  \"Do you have thoughts of hurting yourself or the baby right NOW?\" (e.g., yes, no, N/A)    - PLAN: \"Do you have a specific plan for how you would do this?\" (e.g., gun, knife, overdose, no plan, N/A)      Denies   4. RISK OF HARM - HOMICIDAL IDEATION:  \"Do you ever have thoughts of hurting or killing someone else?\"  (e.g., yes, no, no but preoccupation with thoughts about death)    - INTENT:  \"Do you have thoughts of hurting or killing someone right NOW?\" (e.g., yes, no, N/A)    - PLAN: " "\"Do you have a specific plan for how you would do this?\" (e.g., gun, knife, no plan, N/A)       Denies   5. FUNCTIONAL IMPAIRMENT: \"How have things been going for you overall? Have you had more difficulty than usual doing your normal daily activities?\"  (e.g., better, same, worse; self-care, school, work, interactions)      Feels like daily activities harder to carry out   6. SUPPORT: \"Who is with you now?\" \"Who do you live with?\" \"Do you have family or friends who you can talk to?\"       Going to aunt's house today   7. THERAPIST: \"Do you have a counselor or therapist? Name?\"      Denies   8. STRESSORS: \"Has there been any new stress or recent changes in your life?\"      Single mother-dad lives over an hour away   9. ALCOHOL USE OR SUBSTANCE USE (DRUG USE): \"Do you drink alcohol or use any illegal drugs?\"       Denies   10. OTHER: \"Do you have any other physical symptoms right now?\" (e.g., fever)        Denies   11. DELIVERY DATE: \"When was your delivery date?\"        2/9/24    Protocols used: Postpartum - Depression-ADULT-[place]    "

## 2024-03-05 NOTE — TELEPHONE ENCOUNTER
----- Message from Jennifer Grimes sent at 3/5/2024  9:59 AM EST -----  Telephone call from patient that she would like to be put on Zoloft again. She just had her second baby 4 weeks ago and was put on it after her first baby. She was crying on the phone and very upset. She states she has no thoughts of harming herself, but is feeling very down and having the blues.    She wasn't sure if she needed to come in for an appointment to be evaluated before having the Zoloft sent in, but would like for someone to reach out to her as soon as possible. Please advise and reach out to patient as soon as possible.    She would like the Zoloft sent in to [facility].

## 2024-03-05 NOTE — TELEPHONE ENCOUNTER
Patient called back crying, upset stating she is unable to come to the office for an appointment to get Zoloft prescribed. She states that her toddler is sleeping and when they wake up she is driving to her aunts house so she can help her with the kids and she is not alone. Ilene FARFAN made aware and phone call transferred to Olya Benjamin LPN to manage triage.

## 2024-03-05 NOTE — TELEPHONE ENCOUNTER
I made several attempts to reach the patient by phone today, but unfortunately, I was unable to make contact. As per Ilene FARFAN's request, I sent a message through [employer], urging the patient to get in touch with our office as soon as possible to schedule an appointment for today.

## 2024-03-08 ENCOUNTER — POSTPARTUM VISIT (OUTPATIENT)
Dept: OBGYN CLINIC | Facility: CLINIC | Age: 29
End: 2024-03-08

## 2024-03-08 VITALS
SYSTOLIC BLOOD PRESSURE: 126 MMHG | BODY MASS INDEX: 30.22 KG/M2 | DIASTOLIC BLOOD PRESSURE: 70 MMHG | WEIGHT: 177 LBS | HEIGHT: 64 IN

## 2024-03-08 PROCEDURE — 99024 POSTOP FOLLOW-UP VISIT: CPT | Performed by: OBSTETRICS & GYNECOLOGY

## 2024-03-08 NOTE — PROGRESS NOTES
"Subjective    Patient is a 29 yo  s/p  on 24. She also met criteria for gestational hypertension during her admission. She is breastfeeding, which is going well. She has minimal bleeding. She physically feels well.    Her EPDS score however was 24 earlier this week. She was prescribed Zoloft, which she was able to  from the pharmacy. She also stayed with her aunt for several days who was a NICU nurse, and was able to rest, and now feels better.    OB History          3    Para   2    Term   2       0    AB   1    Living   2         SAB   0    IAB   1    Ectopic   0    Multiple   0    Live Births   2           Obstetric Comments   Menarche: 13  Cycles regular, Q29d, x 4d                  Objective    Vitals:    24 1036   BP: 126/70   BP Location: Left arm   Patient Position: Sitting   Cuff Size: Standard   Height: 5' 4\" (1.626 m)        Physical Exam  Constitutional:       Appearance: Normal appearance.   HENT:      Head: Normocephalic and atraumatic.   Cardiovascular:      Rate and Rhythm: Normal rate.   Pulmonary:      Effort: Pulmonary effort is normal. No respiratory distress.   Musculoskeletal:         General: Normal range of motion.   Neurological:      Mental Status: She is alert.   Skin:     General: Skin is warm and dry.          Assessment    Patient Active Problem List   Diagnosis    Maternal obesity, antepartum, third trimester    Gestational hypertension        Plan  - Continue Zoloft, will titrate as needed  - Contraception plan: abstinence  "

## 2024-03-08 NOTE — TELEPHONE ENCOUNTER
Patient called stating pharmacy is advising they never received the prescription that was called in on 3/5/24.  Patient asked if this could please be resent to [facility] and if she could be contacted once this is done

## 2024-12-20 ENCOUNTER — TELEPHONE (OUTPATIENT)
Age: 29
End: 2024-12-20

## 2024-12-20 NOTE — TELEPHONE ENCOUNTER
Patient calling to schedule IUD insertion - would like Mirena. Concerned about insurance as she has had Mirena before. Advised would send message to clerical team for insurance check.     Appointment scheduled for 1/7/25.

## 2025-01-07 ENCOUNTER — OFFICE VISIT (OUTPATIENT)
Dept: OBGYN CLINIC | Facility: CLINIC | Age: 30
End: 2025-01-07
Payer: COMMERCIAL

## 2025-01-07 VITALS
HEIGHT: 64 IN | SYSTOLIC BLOOD PRESSURE: 112 MMHG | BODY MASS INDEX: 30.59 KG/M2 | DIASTOLIC BLOOD PRESSURE: 68 MMHG | WEIGHT: 179.2 LBS

## 2025-01-07 DIAGNOSIS — Z30.430 ENCOUNTER FOR IUD INSERTION: Primary | ICD-10-CM

## 2025-01-07 DIAGNOSIS — Z11.3 SCREENING EXAMINATION FOR STD (SEXUALLY TRANSMITTED DISEASE): ICD-10-CM

## 2025-01-07 LAB — SL AMB POCT URINE HCG: NEGATIVE

## 2025-01-07 PROCEDURE — 81025 URINE PREGNANCY TEST: CPT | Performed by: NURSE PRACTITIONER

## 2025-01-07 PROCEDURE — 87491 CHLMYD TRACH DNA AMP PROBE: CPT | Performed by: NURSE PRACTITIONER

## 2025-01-07 PROCEDURE — 87591 N.GONORRHOEAE DNA AMP PROB: CPT | Performed by: NURSE PRACTITIONER

## 2025-01-07 PROCEDURE — 58300 INSERT INTRAUTERINE DEVICE: CPT | Performed by: NURSE PRACTITIONER

## 2025-01-07 NOTE — PROGRESS NOTES
Counseled regarding lack of G/C screening prior to IUD insertion.  Last time screened was 8/2023, negative.  Her present partner is new, a few months.  Not using condoms.  Counseled about the risk of inserting infection into the uterus and the potential for adverse outcome.  She expressed understanding and desires insertion regardless.  Screening was collected today.    IUD Procedure    Date/Time: 1/7/2025 3:20 PM    Performed by: NAHEED Cueva  Authorized by: NAHEED Cueva    Written consent obtained?: Yes    Risks and benefits: Risks, benefits and alternatives were discussed    Consent given by:  Patient  Time Out:     Time out performed at:  1/7/2025 3:15 PM  Patient states understanding of procedure being performed: Yes    Patient's understanding of procedure matches consent: Yes    Procedure consent matches procedure scheduled: Yes    Patient identity confirmed:  Verbally with patient  Select procedure: IUD insertion    IUD Insertion:     Negative GC/chlamydia test: collected today.      Negative urine pregnancy test: yes      Cervix cleaned and prepped: yes      Speculum placed in vagina: yes      Tenaculum applied to cervix: no      Allis applied to cervix: no      IUD inserted with no complications: yes      Strings trimmed: yes      Uterus sounded: yes      Uterus sound depth (cm):  7    IUD type:  1 each Levonorgestrel 20 MCG/DAY  Post-procedure:     Patient tolerated procedure well: yes      Patient will follow up after next period: yes    Insertion Comments:      POST IUD INSTRUCTIONS REVIEWED      
01-Jul-2019 17:31

## 2025-01-07 NOTE — PATIENT INSTRUCTIONS
Controlled. Continue Singulair. Albuterol as needed.  Orders:    albuterol (Ventolin HFA) 90 mcg/act inhaler; Inhale 2 puffs every 6 (six) hours as needed for wheezing     POST IUD INSERTION INSTRUCTIONS:    For current episode of bleeding, use sanitary pads only, not tampons.  You may resume using tampons with future periods.  Abstain from sex for 3 days or use condoms.      Call if you experience symptoms of infection, such as foul smelling vaginal discharge, pain that is getting worse instead of better, fever or chills, or very heavy bleeding.    Return visit for IUD check in 6 weeks.

## 2025-01-08 ENCOUNTER — RESULTS FOLLOW-UP (OUTPATIENT)
Dept: OBGYN CLINIC | Facility: CLINIC | Age: 30
End: 2025-01-08

## 2025-01-08 LAB
C TRACH DNA SPEC QL NAA+PROBE: NEGATIVE
N GONORRHOEA DNA SPEC QL NAA+PROBE: NEGATIVE

## 2025-02-25 ENCOUNTER — OFFICE VISIT (OUTPATIENT)
Dept: OBGYN CLINIC | Facility: CLINIC | Age: 30
End: 2025-02-25
Payer: COMMERCIAL

## 2025-02-25 VITALS
SYSTOLIC BLOOD PRESSURE: 102 MMHG | HEIGHT: 64 IN | DIASTOLIC BLOOD PRESSURE: 58 MMHG | BODY MASS INDEX: 31.24 KG/M2 | WEIGHT: 183 LBS

## 2025-02-25 DIAGNOSIS — Z30.431 SURVEILLANCE OF INTRAUTERINE CONTRACEPTIVE DEVICE: Primary | ICD-10-CM

## 2025-02-25 PROBLEM — O13.9 GESTATIONAL HYPERTENSION: Status: RESOLVED | Noted: 2024-02-10 | Resolved: 2025-02-25

## 2025-02-25 PROBLEM — O99.213 MATERNAL OBESITY, ANTEPARTUM, THIRD TRIMESTER: Status: RESOLVED | Noted: 2023-12-15 | Resolved: 2025-02-25

## 2025-02-25 PROCEDURE — 99459 PELVIC EXAMINATION: CPT | Performed by: NURSE PRACTITIONER

## 2025-02-25 PROCEDURE — 99213 OFFICE O/P EST LOW 20 MIN: CPT | Performed by: NURSE PRACTITIONER

## 2025-02-25 NOTE — PROGRESS NOTES
"Assessment / Plan    Assessment & Plan  Surveillance of intrauterine contraceptive device  Normal IUD check.  RV 2025 for yearly.           Subjective      Araseli Conte is a 29 y.o. female   CC: IUD check    Post Mirena IUD insertion of 25  Denies any post insertion symptoms of infection.  Bleeding has regulated.  Very happy with IUD.      Menstrual History:  OB History          3    Para   2    Term   2       0    AB   1    Living   2         SAB   0    IAB   1    Ectopic   0    Multiple   0    Live Births   2           Obstetric Comments   Menarche: 13  Cycles regular, Q29d, x 4d                Patient's last menstrual period was 2025 (exact date).       The following portions of the patient's history were reviewed and updated as appropriate: allergies, current medications, past family history, past medical history, past social history, past surgical history, and problem list.    Review of Systems      Review of Systems   Constitutional:  Negative for chills and fever.   Genitourinary: Negative.        Objective     Visit Vitals  /58 (BP Location: Left arm, Patient Position: Sitting, Cuff Size: Standard)   Ht 5' 4\" (1.626 m)   Wt 83 kg (183 lb)   LMP 2025 (Exact Date)   BMI 31.41 kg/m²   OB Status Having periods   Smoking Status Never   BSA 1.88 m²      *patient agrees to exam without a chaperone present.     /58 (BP Location: Left arm, Patient Position: Sitting, Cuff Size: Standard)   Ht 5' 4\" (1.626 m)   Wt 83 kg (183 lb)   LMP 2025 (Exact Date)   BMI 31.41 kg/m²   Physical Exam  Constitutional:       General: She is not in acute distress.     Appearance: Normal appearance. She is well-developed. She is not ill-appearing or diaphoretic.      Comments: Body mass index is 31.41 kg/m².     HENT:      Head: Normocephalic and atraumatic.   Eyes:      Pupils: Pupils are equal, round, and reactive to light.   Pulmonary:      Effort: Pulmonary effort is normal. "   Abdominal:      General: Abdomen is flat.      Palpations: Abdomen is soft.   Genitourinary:     General: Normal vulva.      Exam position: Lithotomy position.      Labia:         Right: No rash, tenderness, lesion or injury.         Left: No rash, tenderness, lesion or injury.       Vagina: No signs of injury and foreign body. No vaginal discharge, erythema, tenderness or bleeding.      Cervix: No cervical motion tenderness, discharge or friability.      Uterus: Not enlarged and not tender.       Adnexa:         Right: No mass or tenderness.          Left: No mass or tenderness.           Skin:     General: Skin is warm and dry.   Neurological:      General: No focal deficit present.      Mental Status: She is alert and oriented to person, place, and time.   Psychiatric:         Mood and Affect: Mood normal.         Behavior: Behavior normal.         Thought Content: Thought content normal.         Judgment: Judgment normal.

## 2025-06-20 ENCOUNTER — PROCEDURE VISIT (OUTPATIENT)
Age: 30
End: 2025-06-20
Payer: COMMERCIAL

## 2025-06-20 VITALS
HEIGHT: 64 IN | WEIGHT: 176.8 LBS | BODY MASS INDEX: 30.19 KG/M2 | SYSTOLIC BLOOD PRESSURE: 112 MMHG | DIASTOLIC BLOOD PRESSURE: 66 MMHG

## 2025-06-20 DIAGNOSIS — Z30.432 ENCOUNTER FOR IUD REMOVAL: Primary | ICD-10-CM

## 2025-06-20 PROCEDURE — 58301 REMOVE INTRAUTERINE DEVICE: CPT | Performed by: OBSTETRICS & GYNECOLOGY

## 2025-06-20 RX ORDER — HYDROXYZINE HYDROCHLORIDE 10 MG/1
TABLET, FILM COATED ORAL
COMMUNITY
Start: 2025-06-14

## 2025-06-20 RX ORDER — BUSPIRONE HYDROCHLORIDE 5 MG/1
TABLET ORAL
COMMUNITY
Start: 2025-06-12

## 2025-06-20 NOTE — PROGRESS NOTES
IUD Procedure    Date/Time: 6/20/2025 9:12 AM    Performed by: Mile Bearden MD  Authorized by: Mile Bearden MD    Other Assisting Provider: No    Verbal consent obtained?: Yes    Written consent obtained?: Yes    Risks and benefits: Risks, benefits and alternatives were discussed    Consent given by:  Patient  Time Out:     Time out: Immediately prior to the procedure a time out was called    Patient states understanding of procedure being performed: Yes    Patient's understanding of procedure matches consent: Yes    Procedure consent matches procedure scheduled: Yes    Relevant documents present and verified: Yes    Test results available and properly labeled: Yes    Required items: Required blood products, implants, devices and special equipment available    Patient identity confirmed:  Verbally with patient  Select procedure: IUD removal    IUD Removal:     Reason for removal: patient request      Removed without complications: yes      Tenaculum/Allis/Ring Forceps applied to cervix: no      Strings visualized: yes      IUD intact: yes      Performed with ultrasound guidance: no

## 2025-07-11 ENCOUNTER — ANNUAL EXAM (OUTPATIENT)
Age: 30
End: 2025-07-11
Payer: COMMERCIAL

## 2025-07-11 VITALS
HEIGHT: 64 IN | DIASTOLIC BLOOD PRESSURE: 70 MMHG | WEIGHT: 177 LBS | BODY MASS INDEX: 30.22 KG/M2 | SYSTOLIC BLOOD PRESSURE: 118 MMHG

## 2025-07-11 DIAGNOSIS — Z01.419 WOMEN'S ANNUAL ROUTINE GYNECOLOGICAL EXAMINATION: Primary | ICD-10-CM

## 2025-07-11 DIAGNOSIS — Z11.3 ROUTINE SCREENING FOR STI (SEXUALLY TRANSMITTED INFECTION): ICD-10-CM

## 2025-07-11 LAB
HBV SURFACE AG SERPL QL IA: NONREACTIVE
HCV AB SERPL QL IA: NONREACTIVE
HIV 1+2 AB+HIV1 P24 AG SERPL QL IA: NONREACTIVE
T PALLIDUM AB SER-ACNC: NONREACTIVE

## 2025-07-11 PROCEDURE — 87591 N.GONORRHOEAE DNA AMP PROB: CPT | Performed by: OBSTETRICS & GYNECOLOGY

## 2025-07-11 PROCEDURE — S0612 ANNUAL GYNECOLOGICAL EXAMINA: HCPCS | Performed by: OBSTETRICS & GYNECOLOGY

## 2025-07-11 PROCEDURE — 87491 CHLMYD TRACH DNA AMP PROBE: CPT | Performed by: OBSTETRICS & GYNECOLOGY

## 2025-07-11 NOTE — PROGRESS NOTES
"Subjective      Araseli Conte is a 30 y.o. female who presents for annual well woman exam.     GYN:  Denies vaginal discharge, labial erythema or lesions, dyspareunia.  Has not had menstrual cycle since IUD removal, but due to get it  Contraception: recently had IUD removed. Currently using condoms  Patient is sexually active with her male partner.    OB:   female  2 SVDs    :  Denies dysuria, urinary frequency or urgency.  Denies hematuria, flank pain, incontinence.  Denies constipation, diarrhea    Breast:  Denies breast mass, skin changes, dimpling, reddening, nipple retraction.  Denies breast discharge.  Patient's maternal grandmother had breast cancer     General:  ETOH use: rarely  Tobacco use: no  Recreational drug use: no    Screening:  Cervical cancer: last pap smear in . Results were NILM.  STD screening: GCCT obtained today; syphilis, HIV, Hep B/C ordered.    Review of Systems  Pertinent items are noted in HPI.      Objective      /70 (BP Location: Left arm, Patient Position: Sitting, Cuff Size: Adult)   Ht 5' 4\" (1.626 m)   Wt 80.3 kg (177 lb)   LMP 2025 (Exact Date)   BMI 30.38 kg/m²     Physical Exam  Constitutional:       Appearance: Normal appearance.   HENT:      Head: Normocephalic and atraumatic.     Cardiovascular:      Rate and Rhythm: Normal rate.   Pulmonary:      Effort: Pulmonary effort is normal. No respiratory distress.   Chest:   Breasts:     Right: Normal. No mass or tenderness.      Left: Normal. No mass or tenderness.   Abdominal:      Palpations: Abdomen is soft.      Tenderness: There is no abdominal tenderness.   Genitourinary:     General: Normal vulva.      Pubic Area: No rash.       Labia:         Right: No lesion.         Left: No lesion.       Urethra: No urethral lesion.      Vagina: No vaginal discharge or lesions.      Cervix: Normal. No lesion.      Uterus: Normal.       Adnexa: Right adnexa normal and left adnexa normal.        Right: No mass " or tenderness.          Left: No mass or tenderness.        Rectum: No external hemorrhoid.     Skin:     General: Skin is warm and dry.     Neurological:      Mental Status: She is alert.                 Assessment     31 yo presents today for her annual exam     Plan   - Repeat pap smear 2026   - GCCT obtained today; syphilis, HIV, Hep B/C ordered.

## 2025-07-13 LAB
C TRACH DNA SPEC QL NAA+PROBE: NEGATIVE
N GONORRHOEA DNA SPEC QL NAA+PROBE: NEGATIVE